# Patient Record
Sex: MALE | Race: ASIAN | NOT HISPANIC OR LATINO | URBAN - METROPOLITAN AREA
[De-identification: names, ages, dates, MRNs, and addresses within clinical notes are randomized per-mention and may not be internally consistent; named-entity substitution may affect disease eponyms.]

---

## 2019-12-28 ENCOUNTER — INPATIENT (INPATIENT)
Facility: HOSPITAL | Age: 64
LOS: 1 days | Discharge: ANOTHER HOSPITAL | End: 2019-12-30
Attending: INTERNAL MEDICINE | Admitting: INTERNAL MEDICINE
Payer: MEDICAID

## 2019-12-28 VITALS
DIASTOLIC BLOOD PRESSURE: 81 MMHG | TEMPERATURE: 98 F | HEART RATE: 71 BPM | SYSTOLIC BLOOD PRESSURE: 149 MMHG | RESPIRATION RATE: 18 BRPM

## 2019-12-28 DIAGNOSIS — F10.10 ALCOHOL ABUSE, UNCOMPLICATED: ICD-10-CM

## 2019-12-28 DIAGNOSIS — I10 ESSENTIAL (PRIMARY) HYPERTENSION: ICD-10-CM

## 2019-12-28 DIAGNOSIS — E78.5 HYPERLIPIDEMIA, UNSPECIFIED: ICD-10-CM

## 2019-12-28 DIAGNOSIS — Z29.9 ENCOUNTER FOR PROPHYLACTIC MEASURES, UNSPECIFIED: ICD-10-CM

## 2019-12-28 DIAGNOSIS — I20.0 UNSTABLE ANGINA: ICD-10-CM

## 2019-12-28 DIAGNOSIS — R07.9 CHEST PAIN, UNSPECIFIED: ICD-10-CM

## 2019-12-28 LAB
ALBUMIN SERPL ELPH-MCNC: 4.6 G/DL — SIGNIFICANT CHANGE UP (ref 3.3–5)
ALP SERPL-CCNC: 71 U/L — SIGNIFICANT CHANGE UP (ref 40–120)
ALT FLD-CCNC: 30 U/L — SIGNIFICANT CHANGE UP (ref 4–41)
ANION GAP SERPL CALC-SCNC: 15 MMO/L — HIGH (ref 7–14)
APTT BLD: 104.5 SEC — HIGH (ref 27.5–36.3)
APTT BLD: 39.1 SEC — HIGH (ref 27.5–36.3)
AST SERPL-CCNC: 33 U/L — SIGNIFICANT CHANGE UP (ref 4–40)
BASOPHILS # BLD AUTO: 0.08 K/UL — SIGNIFICANT CHANGE UP (ref 0–0.2)
BASOPHILS NFR BLD AUTO: 1 % — SIGNIFICANT CHANGE UP (ref 0–2)
BILIRUB SERPL-MCNC: < 0.2 MG/DL — LOW (ref 0.2–1.2)
BUN SERPL-MCNC: 23 MG/DL — SIGNIFICANT CHANGE UP (ref 7–23)
CALCIUM SERPL-MCNC: 9.9 MG/DL — SIGNIFICANT CHANGE UP (ref 8.4–10.5)
CHLORIDE SERPL-SCNC: 100 MMOL/L — SIGNIFICANT CHANGE UP (ref 98–107)
CK MB BLD-MCNC: 2.16 NG/ML — SIGNIFICANT CHANGE UP (ref 1–6.6)
CK SERPL-CCNC: 186 U/L — SIGNIFICANT CHANGE UP (ref 30–200)
CO2 SERPL-SCNC: 21 MMOL/L — LOW (ref 22–31)
CREAT SERPL-MCNC: 0.9 MG/DL — SIGNIFICANT CHANGE UP (ref 0.5–1.3)
EOSINOPHIL # BLD AUTO: 0.16 K/UL — SIGNIFICANT CHANGE UP (ref 0–0.5)
EOSINOPHIL NFR BLD AUTO: 1.9 % — SIGNIFICANT CHANGE UP (ref 0–6)
ETHANOL BLD-MCNC: < 10 MG/DL — SIGNIFICANT CHANGE UP
GLUCOSE SERPL-MCNC: 89 MG/DL — SIGNIFICANT CHANGE UP (ref 70–99)
HCT VFR BLD CALC: 39.1 % — SIGNIFICANT CHANGE UP (ref 39–50)
HCT VFR BLD CALC: 41.2 % — SIGNIFICANT CHANGE UP (ref 39–50)
HGB BLD-MCNC: 12.8 G/DL — LOW (ref 13–17)
HGB BLD-MCNC: 13.8 G/DL — SIGNIFICANT CHANGE UP (ref 13–17)
IMM GRANULOCYTES NFR BLD AUTO: 1.9 % — HIGH (ref 0–1.5)
INR BLD: 0.98 — SIGNIFICANT CHANGE UP (ref 0.88–1.17)
LYMPHOCYTES # BLD AUTO: 2.22 K/UL — SIGNIFICANT CHANGE UP (ref 1–3.3)
LYMPHOCYTES # BLD AUTO: 26.8 % — SIGNIFICANT CHANGE UP (ref 13–44)
MCHC RBC-ENTMCNC: 32.7 % — SIGNIFICANT CHANGE UP (ref 32–36)
MCHC RBC-ENTMCNC: 33.2 PG — SIGNIFICANT CHANGE UP (ref 27–34)
MCHC RBC-ENTMCNC: 33.5 % — SIGNIFICANT CHANGE UP (ref 32–36)
MCHC RBC-ENTMCNC: 33.8 PG — SIGNIFICANT CHANGE UP (ref 27–34)
MCV RBC AUTO: 101 FL — HIGH (ref 80–100)
MCV RBC AUTO: 101.6 FL — HIGH (ref 80–100)
MONOCYTES # BLD AUTO: 0.78 K/UL — SIGNIFICANT CHANGE UP (ref 0–0.9)
MONOCYTES NFR BLD AUTO: 9.4 % — SIGNIFICANT CHANGE UP (ref 2–14)
NEUTROPHILS # BLD AUTO: 4.88 K/UL — SIGNIFICANT CHANGE UP (ref 1.8–7.4)
NEUTROPHILS NFR BLD AUTO: 59 % — SIGNIFICANT CHANGE UP (ref 43–77)
NRBC # FLD: 0 K/UL — SIGNIFICANT CHANGE UP (ref 0–0)
NRBC # FLD: 0 K/UL — SIGNIFICANT CHANGE UP (ref 0–0)
NT-PROBNP SERPL-SCNC: 23.09 PG/ML — SIGNIFICANT CHANGE UP
PLATELET # BLD AUTO: 304 K/UL — SIGNIFICANT CHANGE UP (ref 150–400)
PLATELET # BLD AUTO: 313 K/UL — SIGNIFICANT CHANGE UP (ref 150–400)
PMV BLD: 9.4 FL — SIGNIFICANT CHANGE UP (ref 7–13)
PMV BLD: 9.4 FL — SIGNIFICANT CHANGE UP (ref 7–13)
POTASSIUM SERPL-MCNC: 4.1 MMOL/L — SIGNIFICANT CHANGE UP (ref 3.5–5.3)
POTASSIUM SERPL-SCNC: 4.1 MMOL/L — SIGNIFICANT CHANGE UP (ref 3.5–5.3)
PROT SERPL-MCNC: 7.6 G/DL — SIGNIFICANT CHANGE UP (ref 6–8.3)
PROTHROM AB SERPL-ACNC: 11.2 SEC — SIGNIFICANT CHANGE UP (ref 9.8–13.1)
RBC # BLD: 3.85 M/UL — LOW (ref 4.2–5.8)
RBC # BLD: 4.08 M/UL — LOW (ref 4.2–5.8)
RBC # FLD: 12.7 % — SIGNIFICANT CHANGE UP (ref 10.3–14.5)
RBC # FLD: 12.9 % — SIGNIFICANT CHANGE UP (ref 10.3–14.5)
SODIUM SERPL-SCNC: 136 MMOL/L — SIGNIFICANT CHANGE UP (ref 135–145)
TROPONIN T, HIGH SENSITIVITY: 11 NG/L — SIGNIFICANT CHANGE UP (ref ?–14)
TROPONIN T, HIGH SENSITIVITY: 12 NG/L — SIGNIFICANT CHANGE UP (ref ?–14)
WBC # BLD: 8.26 K/UL — SIGNIFICANT CHANGE UP (ref 3.8–10.5)
WBC # BLD: 8.28 K/UL — SIGNIFICANT CHANGE UP (ref 3.8–10.5)
WBC # FLD AUTO: 8.26 K/UL — SIGNIFICANT CHANGE UP (ref 3.8–10.5)
WBC # FLD AUTO: 8.28 K/UL — SIGNIFICANT CHANGE UP (ref 3.8–10.5)

## 2019-12-28 PROCEDURE — 71046 X-RAY EXAM CHEST 2 VIEWS: CPT | Mod: 26

## 2019-12-28 RX ORDER — CLOPIDOGREL BISULFATE 75 MG/1
300 TABLET, FILM COATED ORAL ONCE
Refills: 0 | Status: COMPLETED | OUTPATIENT
Start: 2019-12-28 | End: 2019-12-28

## 2019-12-28 RX ORDER — HEPARIN SODIUM 5000 [USP'U]/ML
500 INJECTION INTRAVENOUS; SUBCUTANEOUS
Qty: 25000 | Refills: 0 | Status: DISCONTINUED | OUTPATIENT
Start: 2019-12-28 | End: 2019-12-29

## 2019-12-28 RX ORDER — CLOPIDOGREL BISULFATE 75 MG/1
75 TABLET, FILM COATED ORAL DAILY
Refills: 0 | Status: DISCONTINUED | OUTPATIENT
Start: 2019-12-29 | End: 2019-12-30

## 2019-12-28 RX ORDER — HEPARIN SODIUM 5000 [USP'U]/ML
3500 INJECTION INTRAVENOUS; SUBCUTANEOUS ONCE
Refills: 0 | Status: COMPLETED | OUTPATIENT
Start: 2019-12-28 | End: 2019-12-28

## 2019-12-28 RX ORDER — LABETALOL HCL 100 MG
100 TABLET ORAL
Refills: 0 | Status: DISCONTINUED | OUTPATIENT
Start: 2019-12-28 | End: 2019-12-29

## 2019-12-28 RX ORDER — HEPARIN SODIUM 5000 [USP'U]/ML
3500 INJECTION INTRAVENOUS; SUBCUTANEOUS EVERY 6 HOURS
Refills: 0 | Status: DISCONTINUED | OUTPATIENT
Start: 2019-12-28 | End: 2019-12-28

## 2019-12-28 RX ORDER — ATORVASTATIN CALCIUM 80 MG/1
80 TABLET, FILM COATED ORAL AT BEDTIME
Refills: 0 | Status: DISCONTINUED | OUTPATIENT
Start: 2019-12-28 | End: 2019-12-30

## 2019-12-28 RX ORDER — ATORVASTATIN CALCIUM 80 MG/1
1 TABLET, FILM COATED ORAL
Qty: 0 | Refills: 0 | DISCHARGE

## 2019-12-28 RX ORDER — NICOTINE POLACRILEX 2 MG
1 GUM BUCCAL DAILY
Refills: 0 | Status: DISCONTINUED | OUTPATIENT
Start: 2019-12-28 | End: 2019-12-30

## 2019-12-28 RX ORDER — HEPARIN SODIUM 5000 [USP'U]/ML
INJECTION INTRAVENOUS; SUBCUTANEOUS
Qty: 25000 | Refills: 0 | Status: DISCONTINUED | OUTPATIENT
Start: 2019-12-28 | End: 2019-12-28

## 2019-12-28 RX ORDER — ISOSORBIDE DINITRATE 5 MG/1
5 TABLET ORAL THREE TIMES A DAY
Refills: 0 | Status: DISCONTINUED | OUTPATIENT
Start: 2019-12-28 | End: 2019-12-29

## 2019-12-28 RX ORDER — HEPARIN SODIUM 5000 [USP'U]/ML
3500 INJECTION INTRAVENOUS; SUBCUTANEOUS EVERY 6 HOURS
Refills: 0 | Status: DISCONTINUED | OUTPATIENT
Start: 2019-12-28 | End: 2019-12-29

## 2019-12-28 RX ORDER — INFLUENZA VIRUS VACCINE 15; 15; 15; 15 UG/.5ML; UG/.5ML; UG/.5ML; UG/.5ML
0.5 SUSPENSION INTRAMUSCULAR ONCE
Refills: 0 | Status: DISCONTINUED | OUTPATIENT
Start: 2019-12-28 | End: 2019-12-30

## 2019-12-28 RX ORDER — ASPIRIN/CALCIUM CARB/MAGNESIUM 324 MG
81 TABLET ORAL DAILY
Refills: 0 | Status: DISCONTINUED | OUTPATIENT
Start: 2019-12-28 | End: 2019-12-30

## 2019-12-28 RX ADMIN — ISOSORBIDE DINITRATE 5 MILLIGRAM(S): 5 TABLET ORAL at 22:07

## 2019-12-28 RX ADMIN — CLOPIDOGREL BISULFATE 300 MILLIGRAM(S): 75 TABLET, FILM COATED ORAL at 15:30

## 2019-12-28 RX ADMIN — HEPARIN SODIUM 0 UNIT(S)/HR: 5000 INJECTION INTRAVENOUS; SUBCUTANEOUS at 22:21

## 2019-12-28 RX ADMIN — ATORVASTATIN CALCIUM 80 MILLIGRAM(S): 80 TABLET, FILM COATED ORAL at 21:43

## 2019-12-28 RX ADMIN — Medication 100 MILLIGRAM(S): at 17:43

## 2019-12-28 RX ADMIN — HEPARIN SODIUM 3500 UNIT(S): 5000 INJECTION INTRAVENOUS; SUBCUTANEOUS at 15:31

## 2019-12-28 RX ADMIN — HEPARIN SODIUM 700 UNIT(S)/HR: 5000 INJECTION INTRAVENOUS; SUBCUTANEOUS at 15:31

## 2019-12-28 NOTE — ED PROVIDER NOTE - OBJECTIVE STATEMENT
64M, [place], hx HTN, HLD presents via EMS with chief complaint of 15 minutes mid sternal chest pressure/tightness which resolved prior to EMS arrival. Chest pain associated w shortness of breath, headache, and tingling to LUE. No neck/back pain, diaphoresis, nausea, vomiting, lightheadedness, dizziness, blurry vision associated. EMS administered 162 aspirin prior to ED arrival. On arrival, no chest pain, LUE tingling, or other active symptoms at present time.   No fevers or chills, nausea or vomiting, leg swelling, calf pain, urinary sx, cough, congestion.     Patient reports prior episode of chest discomfort ~1 month ago in [facility] which resolved and had not sought medical care at that time (took advil at that time).    Patient had a syncopal episode 12/15 in [place], associated with seizure-like episode. No reported preceding sx (no chest pain, shortness of breath, nausea). At that time, went to hospital, and patient reportedly had elevated cardiac enzymes at that time of 0.13, 0.24, 0.24 {troponin I}. Also noted to have elevated d-dimer, CTA performed, no PE. CT head w/o acute pathology. Patient left hospital due to insurance coverage issues /  requirement to pay cash as per daughter.    Current smoker. Lives in [facility]. Denies prior hx MI, stents. No recent illness, sick contacts.   No allergies. Not currently taking any medication (except [id number] and statin for the last 1 week which patient has been taking from his daughter supply - not prescribed by physician).

## 2019-12-28 NOTE — ED PROVIDER NOTE - PROGRESS NOTE DETAILS
VM left for tele doc, awaiting callback for admission  Rajinder Sherwood MD, PGY3 Emergency Medicine

## 2019-12-28 NOTE — H&P ADULT - PROBLEM SELECTOR PLAN 4
has withdrawl seizure 12/14, last drink 12/15. check blood etoh level. unlikely to withdrawal if last drink is over 2 weeks ago

## 2019-12-28 NOTE — H&P ADULT - HISTORY OF PRESENT ILLNESS
63 yo Kenyan Male, HTN, HLD presented with CP X15 mins ~9AM at date of admission. Pt reported substernal chest pain with radiation to left arm while sitting, + SOB, dizziness. Denied any diaphoresis, palpitation, syncope, fever, chills, N/V, blurry vision. Pt reported taking baby asa and EMS gave him additional 162mg during transport. Currently CP free. Pt and family reported pt with syncopal episode 12/15 in [place] that provider think is associated with withdrawal seizure. + foaming at the mouth, tongue laceration. CT head and EEG done at the time unremarkable. CTA chest negative for PE. Pt also with + CE at the time recommended for cath and refused further workup, signed out against medical advise. Of not pt drinks quart of hard liquor daily before the syncope 12/14, last drink 12/15.

## 2019-12-28 NOTE — ED PROVIDER NOTE - ATTENDING CONTRIBUTION TO CARE
Kay Schuler M.D: 64M, Kyrgyz, hx htn hld, recent admission in [place] for syncope/seziure at which point he had elevated troponins but no further workup done at that time, presenting with 15 minute episode of chest tightness, described as pressure/someone punching his chest, assoc with sob no diaphoresis no n/v no f/c no cough. given 2 asa by ems. ekg in triage with concern for hyperacute t waves so brought to trauma a. no symptoms at pressent. former smoker. exam unremakrable, agree with as documented by resident.  jongn for unstable angina given pain at rest that is concerning for cardiac ischemia. ekg with concern for hyperacute t waves. plan for labs, ekg, cxr, tele monitoring, serial ekgs. unlikely dissection or pe given no rf for pe and no signs or symptoms consistent with either. no infectious symptoms to suggest pna.

## 2019-12-28 NOTE — ED ADULT NURSE NOTE - CHIEF COMPLAINT QUOTE
Pt arrives via EMS--pt was admitted to a hospital in [place], last week after syncope--troponin was high and pt has no insurance (visiting from [facility])  Pt left and this AM pt c/o chest pressure

## 2019-12-28 NOTE — H&P ADULT - NSHPSOCIALHISTORY_GEN_ALL_CORE
1ppd for 44 yrs  hard liquor quater daily, last drink 1/2/15  denied any ilicit drug use    visiting from [facility], no insurance

## 2019-12-28 NOTE — ED PROVIDER NOTE - PHYSICAL EXAMINATION
General: Well appearing, awake, alert, oriented, no acute distress. Resting in bed.  HEENT: PERRLA EOMI. No trauma/bruising noted to head or face.    CV: Regular rate and rhythm, S1/S2, no murmurs/rubs/gallops noted on exam. No tenderness to palpation to chest wall.   Lungs: Clear to ascultation bilaterally, no wheezes/crackles/rales noted on exam. Equal chest wall excursion noted.   Abdomen: Soft, non tender, non distended, no guarding or rebound.   MSK: Intact ROM of upper and lower extremities bilaterally. No tenderness to palpation to extremities. Intact ROM of neck. No gross deformities noted to extremities.   Neuro: Awake, A+O x4, moving all extremities spontaneously. CN 2-12 grossly intact. No nystagmus noted. Strength and sensation grossly intact to all extremities. Speech fluent, no slurred speech.   Extremities: No swelling or edema noted to extremities. No calf tenderness to palpation.   Skin: No rash or bruising noted on exam.

## 2019-12-28 NOTE — CHART NOTE - NSCHARTNOTEFT_GEN_A_CORE
Pt seen and examined   full H/P to follow   unstable angina   recent admission with elevated trop   start heparin infusion   trend trop  DAPT  statin   cath on monday

## 2019-12-28 NOTE — H&P ADULT - ASSESSMENT
63 yo Lao Male, HTN, HLD, ETOH use last drink 12/15 (reported WD sizure 12/14) presented with CP concern for unstable angina.

## 2019-12-28 NOTE — ED ADULT TRIAGE NOTE - CHIEF COMPLAINT QUOTE
Pt arrives via EMS--pt was admitted to a hospital in [place], last week after syncope--troponin was high and pt has no insurance (visiting from [facility])  Pt left and this AM pt c/o chest pressure

## 2019-12-28 NOTE — H&P ADULT - NSHPLABSRESULTS_GEN_ALL_CORE
13.8   8.28  )-----------( 313      ( 28 Dec 2019 12:23 )             41.2   12-28    136  |  100  |  23  ----------------------------<  89  4.1   |  21<L>  |  0.90    Ca    9.9      28 Dec 2019 12:23    TPro  7.6  /  Alb  4.6  /  TBili  < 0.2<L>  /  DBili  x   /  AST  33  /  ALT  30  /  AlkPhos  71  12-28    trop: 11-->12  EKG: SR 66 TWI AVR, AVL, no ST changes   CXR: clear lung

## 2019-12-28 NOTE — ED PROVIDER NOTE - CLINICAL SUMMARY MEDICAL DECISION MAKING FREE TEXT BOX
64M, [place] African, hx HTN HLD presenting s/p episode chest pain, shortness of breath. Recent syncopal event w elevated troponin at OSH. Exam as above. EKG w q waves precordial. Concern for cardiac etiology. Labs, CXR, will require admit.   Rajinder Sherwood MD, PGY3 Emergency Medicine

## 2019-12-29 LAB
ANION GAP SERPL CALC-SCNC: 14 MMO/L — SIGNIFICANT CHANGE UP (ref 7–14)
APTT BLD: 49.8 SEC — HIGH (ref 27.5–36.3)
BASOPHILS # BLD AUTO: 0.06 K/UL — SIGNIFICANT CHANGE UP (ref 0–0.2)
BASOPHILS NFR BLD AUTO: 0.9 % — SIGNIFICANT CHANGE UP (ref 0–2)
BUN SERPL-MCNC: 22 MG/DL — SIGNIFICANT CHANGE UP (ref 7–23)
CALCIUM SERPL-MCNC: 9.9 MG/DL — SIGNIFICANT CHANGE UP (ref 8.4–10.5)
CHLORIDE SERPL-SCNC: 99 MMOL/L — SIGNIFICANT CHANGE UP (ref 98–107)
CHOLEST SERPL-MCNC: 162 MG/DL — SIGNIFICANT CHANGE UP (ref 120–199)
CO2 SERPL-SCNC: 23 MMOL/L — SIGNIFICANT CHANGE UP (ref 22–31)
CREAT SERPL-MCNC: 0.99 MG/DL — SIGNIFICANT CHANGE UP (ref 0.5–1.3)
EOSINOPHIL # BLD AUTO: 0.22 K/UL — SIGNIFICANT CHANGE UP (ref 0–0.5)
EOSINOPHIL NFR BLD AUTO: 3.4 % — SIGNIFICANT CHANGE UP (ref 0–6)
GLUCOSE SERPL-MCNC: 83 MG/DL — SIGNIFICANT CHANGE UP (ref 70–99)
HCT VFR BLD CALC: 37 % — LOW (ref 39–50)
HCT VFR BLD CALC: 39 % — SIGNIFICANT CHANGE UP (ref 39–50)
HCV AB S/CO SERPL IA: 0.06 S/CO — SIGNIFICANT CHANGE UP (ref 0–0.99)
HCV AB SERPL-IMP: SIGNIFICANT CHANGE UP
HDLC SERPL-MCNC: 88 MG/DL — HIGH (ref 35–55)
HGB BLD-MCNC: 12.2 G/DL — LOW (ref 13–17)
HGB BLD-MCNC: 12.9 G/DL — LOW (ref 13–17)
IMM GRANULOCYTES NFR BLD AUTO: 2 % — HIGH (ref 0–1.5)
LIPID PNL WITH DIRECT LDL SERPL: 64 MG/DL — SIGNIFICANT CHANGE UP
LYMPHOCYTES # BLD AUTO: 1.07 K/UL — SIGNIFICANT CHANGE UP (ref 1–3.3)
LYMPHOCYTES # BLD AUTO: 16.3 % — SIGNIFICANT CHANGE UP (ref 13–44)
MAGNESIUM SERPL-MCNC: 2 MG/DL — SIGNIFICANT CHANGE UP (ref 1.6–2.6)
MCHC RBC-ENTMCNC: 33 % — SIGNIFICANT CHANGE UP (ref 32–36)
MCHC RBC-ENTMCNC: 33.1 % — SIGNIFICANT CHANGE UP (ref 32–36)
MCHC RBC-ENTMCNC: 33.4 PG — SIGNIFICANT CHANGE UP (ref 27–34)
MCHC RBC-ENTMCNC: 33.9 PG — SIGNIFICANT CHANGE UP (ref 27–34)
MCV RBC AUTO: 101.4 FL — HIGH (ref 80–100)
MCV RBC AUTO: 102.6 FL — HIGH (ref 80–100)
MONOCYTES # BLD AUTO: 0.59 K/UL — SIGNIFICANT CHANGE UP (ref 0–0.9)
MONOCYTES NFR BLD AUTO: 9 % — SIGNIFICANT CHANGE UP (ref 2–14)
NEUTROPHILS # BLD AUTO: 4.49 K/UL — SIGNIFICANT CHANGE UP (ref 1.8–7.4)
NEUTROPHILS NFR BLD AUTO: 68.4 % — SIGNIFICANT CHANGE UP (ref 43–77)
NRBC # FLD: 0 K/UL — SIGNIFICANT CHANGE UP (ref 0–0)
NRBC # FLD: 0 K/UL — SIGNIFICANT CHANGE UP (ref 0–0)
PLATELET # BLD AUTO: 289 K/UL — SIGNIFICANT CHANGE UP (ref 150–400)
PLATELET # BLD AUTO: 325 K/UL — SIGNIFICANT CHANGE UP (ref 150–400)
PMV BLD: 9.5 FL — SIGNIFICANT CHANGE UP (ref 7–13)
PMV BLD: 9.9 FL — SIGNIFICANT CHANGE UP (ref 7–13)
POTASSIUM SERPL-MCNC: 4.2 MMOL/L — SIGNIFICANT CHANGE UP (ref 3.5–5.3)
POTASSIUM SERPL-SCNC: 4.2 MMOL/L — SIGNIFICANT CHANGE UP (ref 3.5–5.3)
RBC # BLD: 3.65 M/UL — LOW (ref 4.2–5.8)
RBC # BLD: 3.8 M/UL — LOW (ref 4.2–5.8)
RBC # FLD: 12.8 % — SIGNIFICANT CHANGE UP (ref 10.3–14.5)
RBC # FLD: 13.1 % — SIGNIFICANT CHANGE UP (ref 10.3–14.5)
SODIUM SERPL-SCNC: 136 MMOL/L — SIGNIFICANT CHANGE UP (ref 135–145)
TRIGL SERPL-MCNC: 59 MG/DL — SIGNIFICANT CHANGE UP (ref 10–149)
TSH SERPL-MCNC: 2.39 UIU/ML — SIGNIFICANT CHANGE UP (ref 0.27–4.2)
WBC # BLD: 6.24 K/UL — SIGNIFICANT CHANGE UP (ref 3.8–10.5)
WBC # BLD: 6.56 K/UL — SIGNIFICANT CHANGE UP (ref 3.8–10.5)
WBC # FLD AUTO: 6.24 K/UL — SIGNIFICANT CHANGE UP (ref 3.8–10.5)
WBC # FLD AUTO: 6.56 K/UL — SIGNIFICANT CHANGE UP (ref 3.8–10.5)

## 2019-12-29 RX ORDER — HEPARIN SODIUM 5000 [USP'U]/ML
5000 INJECTION INTRAVENOUS; SUBCUTANEOUS EVERY 8 HOURS
Refills: 0 | Status: DISCONTINUED | OUTPATIENT
Start: 2019-12-29 | End: 2019-12-30

## 2019-12-29 RX ORDER — AMLODIPINE BESYLATE 2.5 MG/1
5 TABLET ORAL DAILY
Refills: 0 | Status: DISCONTINUED | OUTPATIENT
Start: 2019-12-29 | End: 2019-12-30

## 2019-12-29 RX ADMIN — Medication 81 MILLIGRAM(S): at 11:26

## 2019-12-29 RX ADMIN — Medication 1 PATCH: at 19:00

## 2019-12-29 RX ADMIN — ISOSORBIDE DINITRATE 5 MILLIGRAM(S): 5 TABLET ORAL at 05:05

## 2019-12-29 RX ADMIN — ATORVASTATIN CALCIUM 80 MILLIGRAM(S): 80 TABLET, FILM COATED ORAL at 22:36

## 2019-12-29 RX ADMIN — HEPARIN SODIUM 500 UNIT(S)/HR: 5000 INJECTION INTRAVENOUS; SUBCUTANEOUS at 00:15

## 2019-12-29 RX ADMIN — HEPARIN SODIUM 600 UNIT(S)/HR: 5000 INJECTION INTRAVENOUS; SUBCUTANEOUS at 08:46

## 2019-12-29 RX ADMIN — Medication 1 PATCH: at 11:26

## 2019-12-29 RX ADMIN — CLOPIDOGREL BISULFATE 75 MILLIGRAM(S): 75 TABLET, FILM COATED ORAL at 11:26

## 2019-12-29 NOTE — PROGRESS NOTE ADULT - ASSESSMENT
Unstable angina  cont dapt  no evidence of acute mI  will dc heparin   cath tomorrow     HTN  amlodipine  will dc bb due to one episode of missed beat avb     HA  due to nitrates  will dc it

## 2019-12-29 NOTE — PROGRESS NOTE ADULT - SUBJECTIVE AND OBJECTIVE BOX
Subjective: Patient seen and examined. No new events except as noted.     SUBJECTIVE/ROS:  No chest pain, dyspnea, palpitation, or dizziness.   has mild HA       MEDICATIONS:  MEDICATIONS  (STANDING):  amLODIPine   Tablet 5 milliGRAM(s) Oral daily  aspirin enteric coated 81 milliGRAM(s) Oral daily  atorvastatin 80 milliGRAM(s) Oral at bedtime  clopidogrel Tablet 75 milliGRAM(s) Oral daily  heparin  Infusion. 500 Unit(s)/Hr (5 mL/Hr) IV Continuous <Continuous>  influenza   Vaccine 0.5 milliLiter(s) IntraMuscular once  nicotine - 21 mG/24Hr(s) Patch 1 patch Transdermal daily      PHYSICAL EXAM:  T(C): 36.3 (12-29-19 @ 09:00), Max: 36.9 (12-28-19 @ 16:38)  HR: 76 (12-29-19 @ 09:00) (54 - 76)  BP: 113/72 (12-29-19 @ 09:00) (107/74 - 181/85)  RR: 16 (12-29-19 @ 09:00) (16 - 18)  SpO2: 100% (12-29-19 @ 09:00) (99% - 100%)  Wt(kg): --  I&O's Summary    Height (cm): 180.34 (12-28 @ 15:42)  Weight (kg): 57.2 (12-28 @ 15:42)  BMI (kg/m2): 17.6 (12-28 @ 15:42)  BSA (m2): 1.73 (12-28 @ 15:42)      JVP: Normal  Neck: supple  Lung: clear   CV: S1 S2 , Murmur:  Abd: soft  Ext: No edema  neuro: Awake / alert  Psych: flat affect  Skin: normal``    LABS/DATA:    CARDIAC MARKERS:  CARDIAC MARKERS ( 28 Dec 2019 13:59 )  x     / x     / 186 u/L / 2.16 ng/mL / x                                    12.9   6.56  )-----------( 325      ( 29 Dec 2019 06:35 )             39.0     12-29    136  |  99  |  22  ----------------------------<  83  4.2   |  23  |  0.99    Ca    9.9      29 Dec 2019 06:35  Mg     2.0     12-29    TPro  7.6  /  Alb  4.6  /  TBili  < 0.2<L>  /  DBili  x   /  AST  33  /  ALT  30  /  AlkPhos  71  12-28    proBNP: Serum Pro-Brain Natriuretic Peptide: 23.09 pg/mL (12-28 @ 12:23)    Lipid Profile:   HgA1c:   TSH: Thyroid Stimulating Hormone, Serum: 2.39 uIU/mL (12-29 @ 06:35)      TELE:  EKG:

## 2019-12-30 ENCOUNTER — TRANSCRIPTION ENCOUNTER (OUTPATIENT)
Age: 64
End: 2019-12-30

## 2019-12-30 VITALS
SYSTOLIC BLOOD PRESSURE: 140 MMHG | DIASTOLIC BLOOD PRESSURE: 88 MMHG | RESPIRATION RATE: 17 BRPM | TEMPERATURE: 98 F | HEART RATE: 82 BPM | OXYGEN SATURATION: 99 %

## 2019-12-30 VITALS
HEART RATE: 98 BPM | OXYGEN SATURATION: 99 % | WEIGHT: 125.66 LBS | SYSTOLIC BLOOD PRESSURE: 137 MMHG | TEMPERATURE: 98 F | HEIGHT: 71 IN | RESPIRATION RATE: 18 BRPM | DIASTOLIC BLOOD PRESSURE: 88 MMHG

## 2019-12-30 LAB
ANION GAP SERPL CALC-SCNC: 12 MMO/L — SIGNIFICANT CHANGE UP (ref 7–14)
BASOPHILS # BLD AUTO: 0.07 K/UL — SIGNIFICANT CHANGE UP (ref 0–0.2)
BASOPHILS NFR BLD AUTO: 1 % — SIGNIFICANT CHANGE UP (ref 0–2)
BUN SERPL-MCNC: 19 MG/DL — SIGNIFICANT CHANGE UP (ref 7–23)
CALCIUM SERPL-MCNC: 9.6 MG/DL — SIGNIFICANT CHANGE UP (ref 8.4–10.5)
CHLORIDE SERPL-SCNC: 101 MMOL/L — SIGNIFICANT CHANGE UP (ref 98–107)
CO2 SERPL-SCNC: 25 MMOL/L — SIGNIFICANT CHANGE UP (ref 22–31)
CREAT SERPL-MCNC: 0.89 MG/DL — SIGNIFICANT CHANGE UP (ref 0.5–1.3)
EOSINOPHIL # BLD AUTO: 0.23 K/UL — SIGNIFICANT CHANGE UP (ref 0–0.5)
EOSINOPHIL NFR BLD AUTO: 3.1 % — SIGNIFICANT CHANGE UP (ref 0–6)
GLUCOSE SERPL-MCNC: 90 MG/DL — SIGNIFICANT CHANGE UP (ref 70–99)
HCT VFR BLD CALC: 37.1 % — LOW (ref 39–50)
HCT VFR BLD CALC: 37.1 % — LOW (ref 39–50)
HGB BLD-MCNC: 12.1 G/DL — LOW (ref 13–17)
HGB BLD-MCNC: 12.1 G/DL — LOW (ref 13–17)
IMM GRANULOCYTES NFR BLD AUTO: 1.4 % — SIGNIFICANT CHANGE UP (ref 0–1.5)
LYMPHOCYTES # BLD AUTO: 1.88 K/UL — SIGNIFICANT CHANGE UP (ref 1–3.3)
LYMPHOCYTES # BLD AUTO: 25.7 % — SIGNIFICANT CHANGE UP (ref 13–44)
MAGNESIUM SERPL-MCNC: 2 MG/DL — SIGNIFICANT CHANGE UP (ref 1.6–2.6)
MCHC RBC-ENTMCNC: 32.6 % — SIGNIFICANT CHANGE UP (ref 32–36)
MCHC RBC-ENTMCNC: 32.6 % — SIGNIFICANT CHANGE UP (ref 32–36)
MCHC RBC-ENTMCNC: 32.8 PG — SIGNIFICANT CHANGE UP (ref 27–34)
MCHC RBC-ENTMCNC: 32.8 PG — SIGNIFICANT CHANGE UP (ref 27–34)
MCV RBC AUTO: 100.5 FL — HIGH (ref 80–100)
MCV RBC AUTO: 100.5 FL — HIGH (ref 80–100)
MONOCYTES # BLD AUTO: 0.85 K/UL — SIGNIFICANT CHANGE UP (ref 0–0.9)
MONOCYTES NFR BLD AUTO: 11.6 % — SIGNIFICANT CHANGE UP (ref 2–14)
NEUTROPHILS # BLD AUTO: 4.19 K/UL — SIGNIFICANT CHANGE UP (ref 1.8–7.4)
NEUTROPHILS NFR BLD AUTO: 57.2 % — SIGNIFICANT CHANGE UP (ref 43–77)
NRBC # FLD: 0 K/UL — SIGNIFICANT CHANGE UP (ref 0–0)
NRBC # FLD: 0 K/UL — SIGNIFICANT CHANGE UP (ref 0–0)
PLATELET # BLD AUTO: 307 K/UL — SIGNIFICANT CHANGE UP (ref 150–400)
PLATELET # BLD AUTO: 307 K/UL — SIGNIFICANT CHANGE UP (ref 150–400)
PMV BLD: 9.4 FL — SIGNIFICANT CHANGE UP (ref 7–13)
PMV BLD: 9.4 FL — SIGNIFICANT CHANGE UP (ref 7–13)
POTASSIUM SERPL-MCNC: 4.1 MMOL/L — SIGNIFICANT CHANGE UP (ref 3.5–5.3)
POTASSIUM SERPL-SCNC: 4.1 MMOL/L — SIGNIFICANT CHANGE UP (ref 3.5–5.3)
RBC # BLD: 3.69 M/UL — LOW (ref 4.2–5.8)
RBC # BLD: 3.69 M/UL — LOW (ref 4.2–5.8)
RBC # FLD: 12.8 % — SIGNIFICANT CHANGE UP (ref 10.3–14.5)
RBC # FLD: 12.8 % — SIGNIFICANT CHANGE UP (ref 10.3–14.5)
SODIUM SERPL-SCNC: 138 MMOL/L — SIGNIFICANT CHANGE UP (ref 135–145)
WBC # BLD: 7.32 K/UL — SIGNIFICANT CHANGE UP (ref 3.8–10.5)
WBC # BLD: 7.32 K/UL — SIGNIFICANT CHANGE UP (ref 3.8–10.5)
WBC # FLD AUTO: 7.32 K/UL — SIGNIFICANT CHANGE UP (ref 3.8–10.5)
WBC # FLD AUTO: 7.32 K/UL — SIGNIFICANT CHANGE UP (ref 3.8–10.5)

## 2019-12-30 PROCEDURE — 93458 L HRT ARTERY/VENTRICLE ANGIO: CPT | Mod: 26

## 2019-12-30 RX ORDER — NICOTINE POLACRILEX 2 MG
21 GUM BUCCAL
Qty: 0 | Refills: 0 | DISCHARGE
Start: 2019-12-30

## 2019-12-30 RX ORDER — AMLODIPINE BESYLATE 2.5 MG/1
1 TABLET ORAL
Qty: 0 | Refills: 0 | DISCHARGE

## 2019-12-30 RX ORDER — ASPIRIN/CALCIUM CARB/MAGNESIUM 324 MG
1 TABLET ORAL
Qty: 0 | Refills: 0 | DISCHARGE
Start: 2019-12-30

## 2019-12-30 RX ORDER — HEPARIN SODIUM 5000 [USP'U]/ML
5000 INJECTION INTRAVENOUS; SUBCUTANEOUS
Qty: 0 | Refills: 0 | DISCHARGE
Start: 2019-12-30

## 2019-12-30 RX ORDER — ATORVASTATIN CALCIUM 80 MG/1
1 TABLET, FILM COATED ORAL
Qty: 0 | Refills: 0 | DISCHARGE

## 2019-12-30 RX ORDER — ASPIRIN/CALCIUM CARB/MAGNESIUM 324 MG
1 TABLET ORAL
Qty: 0 | Refills: 0 | DISCHARGE

## 2019-12-30 RX ORDER — AMLODIPINE BESYLATE 2.5 MG/1
1 TABLET ORAL
Qty: 0 | Refills: 0 | DISCHARGE
Start: 2019-12-30

## 2019-12-30 RX ORDER — ATORVASTATIN CALCIUM 80 MG/1
1 TABLET, FILM COATED ORAL
Qty: 0 | Refills: 0 | DISCHARGE
Start: 2019-12-30

## 2019-12-30 RX ADMIN — Medication 81 MILLIGRAM(S): at 09:47

## 2019-12-30 RX ADMIN — AMLODIPINE BESYLATE 5 MILLIGRAM(S): 2.5 TABLET ORAL at 05:43

## 2019-12-30 RX ADMIN — ATORVASTATIN CALCIUM 80 MILLIGRAM(S): 80 TABLET, FILM COATED ORAL at 21:20

## 2019-12-30 RX ADMIN — CLOPIDOGREL BISULFATE 75 MILLIGRAM(S): 75 TABLET, FILM COATED ORAL at 09:47

## 2019-12-30 RX ADMIN — HEPARIN SODIUM 5000 UNIT(S): 5000 INJECTION INTRAVENOUS; SUBCUTANEOUS at 21:20

## 2019-12-30 NOTE — DISCHARGE NOTE PROVIDER - HOSPITAL COURSE
63 yo [place] Male, HTN, HLD, ETOH use last drink 12/15 (reported WD seizure 12/14) presented with CP concern for unstable angina.             Cath; 12/30 [facility]: pLAD 80%, pLCx 50%, mLCx 90%, pOM1 99%, mRCA 100%, EF 55%, RFA accessed        Pt to be transferrred to [facility] for CABG evaluation.

## 2019-12-30 NOTE — DISCHARGE NOTE PROVIDER - NSDCCPCAREPLAN_GEN_ALL_CORE_FT
PRINCIPAL DISCHARGE DIAGNOSIS  Diagnosis: Chest pain  Assessment and Plan of Treatment: CAD found on cardiac cath today, transfer to [facility] for CABG evaluation, ASA to continue

## 2019-12-30 NOTE — DISCHARGE NOTE PROVIDER - CARE PROVIDER_API CALL
[facility],   Transfer to [facility]  Phone: (   )    -  Fax: (   )    -  Follow Up Time:

## 2019-12-30 NOTE — PROGRESS NOTE ADULT - SUBJECTIVE AND OBJECTIVE BOX
Subjective: Patient seen and examined. No new events except as noted.     SUBJECTIVE/ROS:  No chest pain, dyspnea, palpitation, or dizziness.       MEDICATIONS:  MEDICATIONS  (STANDING):  amLODIPine   Tablet 5 milliGRAM(s) Oral daily  aspirin enteric coated 81 milliGRAM(s) Oral daily  atorvastatin 80 milliGRAM(s) Oral at bedtime  clopidogrel Tablet 75 milliGRAM(s) Oral daily  heparin  Injectable 5000 Unit(s) SubCutaneous every 8 hours  influenza   Vaccine 0.5 milliLiter(s) IntraMuscular once  nicotine - 21 mG/24Hr(s) Patch 1 patch Transdermal daily      PHYSICAL EXAM:  T(C): 36.6 (12-30-19 @ 05:41), Max: 36.6 (12-29-19 @ 22:35)  HR: 72 (12-30-19 @ 05:41) (72 - 78)  BP: 121/84 (12-30-19 @ 05:41) (113/72 - 127/80)  RR: 18 (12-30-19 @ 05:41) (16 - 18)  SpO2: 99% (12-30-19 @ 05:41) (99% - 100%)  Wt(kg): --  I&O's Summary          JVP: Normal  Neck: supple  Lung: clear   CV: S1 S2 , Murmur:  Abd: soft  Ext: No edema  neuro: Awake / alert  Psych: flat affect  Skin: normal``    LABS/DATA:    CARDIAC MARKERS:  CARDIAC MARKERS ( 28 Dec 2019 13:59 )  x     / x     / 186 u/L / 2.16 ng/mL / x                                    12.1   7.32  )-----------( 307      ( 30 Dec 2019 06:40 )             37.1     12-30    138  |  101  |  19  ----------------------------<  90  4.1   |  25  |  0.89    Ca    9.6      30 Dec 2019 06:40  Mg     2.0     12-30    TPro  7.6  /  Alb  4.6  /  TBili  < 0.2<L>  /  DBili  x   /  AST  33  /  ALT  30  /  AlkPhos  71  12-28    proBNP:   Lipid Profile:   HgA1c:   TSH:     TELE:  EKG:

## 2019-12-30 NOTE — DISCHARGE NOTE PROVIDER - NSDCMRMEDTOKEN_GEN_ALL_CORE_FT
amLODIPine 5 mg oral tablet: 1 tab(s) orally once a day  aspirin 81 mg oral delayed release tablet: 1 tab(s) orally once a day  atorvastatin 80 mg oral tablet: 1 tab(s) orally once a day (at bedtime)  heparin: 5000 unit(s) subcutaneously every 8 hours  nicotine 21 mg/24 hr transdermal film, extended release: 21 milligram(s) transdermal once a day

## 2019-12-30 NOTE — DISCHARGE NOTE NURSING/CASE MANAGEMENT/SOCIAL WORK - PATIENT PORTAL LINK FT
You can access the FollowMyHealth Patient Portal offered by [facility] by registering at the following website: [email or web address] By joining [email or web address] FollowMyHealth portal, you will also be able to view your health information using other applications (apps) compatible with our system.

## 2019-12-30 NOTE — DISCHARGE NOTE NURSING/CASE MANAGEMENT/SOCIAL WORK - NSDCPEEMAIL_GEN_ALL_CORE
[facility] for Tobacco Control email [email or web address]

## 2019-12-30 NOTE — DISCHARGE NOTE NURSING/CASE MANAGEMENT/SOCIAL WORK - NSDCPEWEB_GEN_ALL_CORE
[place] website --- [email or web address] Tobacco [email or web address] website --- [email or web address]

## 2019-12-31 ENCOUNTER — INPATIENT (INPATIENT)
Facility: HOSPITAL | Age: 64
LOS: 5 days | Discharge: HOME HEALTH | DRG: 235 | End: 2020-01-06
Attending: THORACIC SURGERY (CARDIOTHORACIC VASCULAR SURGERY) | Admitting: THORACIC SURGERY (CARDIOTHORACIC VASCULAR SURGERY)
Payer: MEDICAID

## 2019-12-31 DIAGNOSIS — J43.2 CENTRILOBULAR EMPHYSEMA: ICD-10-CM

## 2019-12-31 DIAGNOSIS — E78.5 HYPERLIPIDEMIA, UNSPECIFIED: ICD-10-CM

## 2019-12-31 DIAGNOSIS — I25.10 ATHEROSCLEROTIC HEART DISEASE OF NATIVE CORONARY ARTERY WITHOUT ANGINA PECTORIS: ICD-10-CM

## 2019-12-31 DIAGNOSIS — Z87.891 PERSONAL HISTORY OF NICOTINE DEPENDENCE: ICD-10-CM

## 2019-12-31 DIAGNOSIS — K21.0 GASTRO-ESOPHAGEAL REFLUX DISEASE WITH ESOPHAGITIS: ICD-10-CM

## 2019-12-31 PROBLEM — I10 ESSENTIAL (PRIMARY) HYPERTENSION: Chronic | Status: ACTIVE | Noted: 2019-12-28

## 2019-12-31 PROBLEM — Z00.00 ENCOUNTER FOR PREVENTIVE HEALTH EXAMINATION: Status: ACTIVE | Noted: 2019-12-31

## 2019-12-31 LAB
ALBUMIN SERPL ELPH-MCNC: 4.7 G/DL — SIGNIFICANT CHANGE UP (ref 3.3–5)
ALP SERPL-CCNC: 73 U/L — SIGNIFICANT CHANGE UP (ref 40–120)
ALT FLD-CCNC: 26 U/L — SIGNIFICANT CHANGE UP (ref 10–45)
ANION GAP SERPL CALC-SCNC: 14 MMOL/L — SIGNIFICANT CHANGE UP (ref 5–17)
APPEARANCE UR: CLEAR — SIGNIFICANT CHANGE UP
APTT BLD: 42.5 SEC — HIGH (ref 27.5–36.3)
AST SERPL-CCNC: 26 U/L — SIGNIFICANT CHANGE UP (ref 10–40)
BASOPHILS # BLD AUTO: 0.07 K/UL — SIGNIFICANT CHANGE UP (ref 0–0.2)
BASOPHILS NFR BLD AUTO: 0.9 % — SIGNIFICANT CHANGE UP (ref 0–2)
BILIRUB SERPL-MCNC: 0.3 MG/DL — SIGNIFICANT CHANGE UP (ref 0.2–1.2)
BILIRUB UR-MCNC: NEGATIVE — SIGNIFICANT CHANGE UP
BLD GP AB SCN SERPL QL: NEGATIVE — SIGNIFICANT CHANGE UP
BUN SERPL-MCNC: 17 MG/DL — SIGNIFICANT CHANGE UP (ref 7–23)
CALCIUM SERPL-MCNC: 10 MG/DL — SIGNIFICANT CHANGE UP (ref 8.4–10.5)
CHLORIDE SERPL-SCNC: 97 MMOL/L — SIGNIFICANT CHANGE UP (ref 96–108)
CO2 SERPL-SCNC: 25 MMOL/L — SIGNIFICANT CHANGE UP (ref 22–31)
COLOR SPEC: SIGNIFICANT CHANGE UP
CREAT SERPL-MCNC: 0.93 MG/DL — SIGNIFICANT CHANGE UP (ref 0.5–1.3)
DIFF PNL FLD: NEGATIVE — SIGNIFICANT CHANGE UP
EOSINOPHIL # BLD AUTO: 0.23 K/UL — SIGNIFICANT CHANGE UP (ref 0–0.5)
EOSINOPHIL NFR BLD AUTO: 3 % — SIGNIFICANT CHANGE UP (ref 0–6)
GLUCOSE SERPL-MCNC: 89 MG/DL — SIGNIFICANT CHANGE UP (ref 70–99)
GLUCOSE UR QL: NEGATIVE — SIGNIFICANT CHANGE UP
HBA1C BLD-MCNC: 5.2 % — SIGNIFICANT CHANGE UP (ref 4–5.6)
HCT VFR BLD CALC: 42.9 % — SIGNIFICANT CHANGE UP (ref 39–50)
HGB BLD-MCNC: 13.9 G/DL — SIGNIFICANT CHANGE UP (ref 13–17)
IMM GRANULOCYTES NFR BLD AUTO: 0.8 % — SIGNIFICANT CHANGE UP (ref 0–1.5)
INR BLD: 1.04 RATIO — SIGNIFICANT CHANGE UP (ref 0.88–1.16)
KETONES UR-MCNC: NEGATIVE — SIGNIFICANT CHANGE UP
LEUKOCYTE ESTERASE UR-ACNC: NEGATIVE — SIGNIFICANT CHANGE UP
LYMPHOCYTES # BLD AUTO: 2.12 K/UL — SIGNIFICANT CHANGE UP (ref 1–3.3)
LYMPHOCYTES # BLD AUTO: 27.6 % — SIGNIFICANT CHANGE UP (ref 13–44)
MCHC RBC-ENTMCNC: 32.4 GM/DL — SIGNIFICANT CHANGE UP (ref 32–36)
MCHC RBC-ENTMCNC: 32.9 PG — SIGNIFICANT CHANGE UP (ref 27–34)
MCV RBC AUTO: 101.4 FL — HIGH (ref 80–100)
MONOCYTES # BLD AUTO: 0.72 K/UL — SIGNIFICANT CHANGE UP (ref 0–0.9)
MONOCYTES NFR BLD AUTO: 9.4 % — SIGNIFICANT CHANGE UP (ref 2–14)
MRSA PCR RESULT.: SIGNIFICANT CHANGE UP
NEUTROPHILS # BLD AUTO: 4.47 K/UL — SIGNIFICANT CHANGE UP (ref 1.8–7.4)
NEUTROPHILS NFR BLD AUTO: 58.3 % — SIGNIFICANT CHANGE UP (ref 43–77)
NITRITE UR-MCNC: NEGATIVE — SIGNIFICANT CHANGE UP
NRBC # BLD: 0 /100 WBCS — SIGNIFICANT CHANGE UP (ref 0–0)
PA ADP PRP-ACNC: 131 PRU — LOW (ref 194–417)
PH UR: 6 — SIGNIFICANT CHANGE UP (ref 5–8)
PLATELET # BLD AUTO: 324 K/UL — SIGNIFICANT CHANGE UP (ref 150–400)
POTASSIUM SERPL-MCNC: 4.1 MMOL/L — SIGNIFICANT CHANGE UP (ref 3.5–5.3)
POTASSIUM SERPL-SCNC: 4.1 MMOL/L — SIGNIFICANT CHANGE UP (ref 3.5–5.3)
PROT SERPL-MCNC: 7.8 G/DL — SIGNIFICANT CHANGE UP (ref 6–8.3)
PROT UR-MCNC: NEGATIVE — SIGNIFICANT CHANGE UP
PROTHROM AB SERPL-ACNC: 12 SEC — SIGNIFICANT CHANGE UP (ref 10–12.9)
RBC # BLD: 4.23 M/UL — SIGNIFICANT CHANGE UP (ref 4.2–5.8)
RBC # FLD: 12.7 % — SIGNIFICANT CHANGE UP (ref 10.3–14.5)
RH IG SCN BLD-IMP: POSITIVE — SIGNIFICANT CHANGE UP
S AUREUS DNA NOSE QL NAA+PROBE: SIGNIFICANT CHANGE UP
SODIUM SERPL-SCNC: 136 MMOL/L — SIGNIFICANT CHANGE UP (ref 135–145)
SP GR SPEC: 1.03 — HIGH (ref 1.01–1.02)
UROBILINOGEN FLD QL: NEGATIVE — SIGNIFICANT CHANGE UP
WBC # BLD: 7.67 K/UL — SIGNIFICANT CHANGE UP (ref 3.8–10.5)
WBC # FLD AUTO: 7.67 K/UL — SIGNIFICANT CHANGE UP (ref 3.8–10.5)

## 2019-12-31 PROCEDURE — ZZZZZ: CPT

## 2019-12-31 PROCEDURE — 71045 X-RAY EXAM CHEST 1 VIEW: CPT | Mod: 26

## 2019-12-31 PROCEDURE — 93880 EXTRACRANIAL BILAT STUDY: CPT | Mod: 26

## 2019-12-31 PROCEDURE — 94010 BREATHING CAPACITY TEST: CPT | Mod: 26

## 2019-12-31 PROCEDURE — 93306 TTE W/DOPPLER COMPLETE: CPT | Mod: 26

## 2019-12-31 PROCEDURE — 71250 CT THORAX DX C-: CPT | Mod: 26

## 2019-12-31 PROCEDURE — 99222 1ST HOSP IP/OBS MODERATE 55: CPT

## 2019-12-31 RX ORDER — INFLUENZA VIRUS VACCINE 15; 15; 15; 15 UG/.5ML; UG/.5ML; UG/.5ML; UG/.5ML
0.5 SUSPENSION INTRAMUSCULAR ONCE
Refills: 0 | Status: DISCONTINUED | OUTPATIENT
Start: 2019-12-31 | End: 2020-01-06

## 2019-12-31 RX ORDER — CEFUROXIME AXETIL 250 MG
1500 TABLET ORAL ONCE
Refills: 0 | Status: COMPLETED | OUTPATIENT
Start: 2019-12-31 | End: 2019-12-31

## 2019-12-31 RX ORDER — ATORVASTATIN CALCIUM 80 MG/1
80 TABLET, FILM COATED ORAL AT BEDTIME
Refills: 0 | Status: DISCONTINUED | OUTPATIENT
Start: 2019-12-31 | End: 2020-01-02

## 2019-12-31 RX ORDER — SODIUM CHLORIDE 9 MG/ML
3 INJECTION INTRAMUSCULAR; INTRAVENOUS; SUBCUTANEOUS EVERY 8 HOURS
Refills: 0 | Status: DISCONTINUED | OUTPATIENT
Start: 2019-12-31 | End: 2020-01-02

## 2019-12-31 RX ORDER — CHLORHEXIDINE GLUCONATE 213 G/1000ML
15 SOLUTION TOPICAL ONCE
Refills: 0 | Status: COMPLETED | OUTPATIENT
Start: 2019-12-31 | End: 2020-01-02

## 2019-12-31 RX ORDER — CHLORHEXIDINE GLUCONATE 213 G/1000ML
1 SOLUTION TOPICAL ONCE
Refills: 0 | Status: COMPLETED | OUTPATIENT
Start: 2019-12-31 | End: 2019-12-31

## 2019-12-31 RX ORDER — METOPROLOL TARTRATE 50 MG
25 TABLET ORAL
Refills: 0 | Status: DISCONTINUED | OUTPATIENT
Start: 2019-12-31 | End: 2019-12-31

## 2019-12-31 RX ORDER — ASPIRIN/CALCIUM CARB/MAGNESIUM 324 MG
81 TABLET ORAL DAILY
Refills: 0 | Status: DISCONTINUED | OUTPATIENT
Start: 2019-12-31 | End: 2020-01-02

## 2019-12-31 RX ADMIN — ATORVASTATIN CALCIUM 80 MILLIGRAM(S): 80 TABLET, FILM COATED ORAL at 21:48

## 2019-12-31 RX ADMIN — SODIUM CHLORIDE 3 MILLILITER(S): 9 INJECTION INTRAMUSCULAR; INTRAVENOUS; SUBCUTANEOUS at 13:19

## 2019-12-31 RX ADMIN — Medication 81 MILLIGRAM(S): at 11:38

## 2019-12-31 RX ADMIN — SODIUM CHLORIDE 3 MILLILITER(S): 9 INJECTION INTRAMUSCULAR; INTRAVENOUS; SUBCUTANEOUS at 05:57

## 2019-12-31 RX ADMIN — Medication 25 MILLIGRAM(S): at 17:03

## 2019-12-31 RX ADMIN — Medication 25 MILLIGRAM(S): at 05:59

## 2019-12-31 RX ADMIN — SODIUM CHLORIDE 3 MILLILITER(S): 9 INJECTION INTRAMUSCULAR; INTRAVENOUS; SUBCUTANEOUS at 21:48

## 2019-12-31 NOTE — H&P ADULT - HISTORY OF PRESENT ILLNESS
Two medical record numbers:  [id number] Two medical record numbers:  [facility]#: [id number] 63 yo Brazilian, English speaking male with pmh of HTN, HLD, everyday tobacco use, ETOH abuse with consumption of roughly a quart of hard liquor daily, and syncopal episode on 12/15 while on vacation in [place].  The seizure was observed by family and was associated with foaming at the mouth and resulted in a tongue laceration.  Suspect possibly ETOH withdrawal seizure.  Work up with CT-Head and CT-Angio were negative and reports are on chart for review.  In addition, he was diagnosed with NSTEMI, refused further evaluation and  left AMA.  He presented to [facility] on 12/28/19 after experiencing left sided chest pain which lasted roughly 15 minutes and was associated with radiation to the left arm, SOB, and dizziness.   He underwent cardiac cath and was found to have multivessel CAD.   Transferred to [facility] for surgical intervention.   Currently, he is chest pain free and denies shortness of breath, jaw pain, dizziness, headache, nausea.   He also states that he was being treated for high blood pressure in [facility] but stopped taking his medication as he felt better.   Received 75mg plavix on 12/30 prior to cath  Two medical record numbers:  [facility]#: [id number]

## 2019-12-31 NOTE — H&P ADULT - NSHPREVIEWOFSYSTEMS_GEN_ALL_CORE
Review of Systems: negative   Constitutional: [ ] fever, [ ]weight loss,  [ ]fatigue  Eyes: [ ] visual changes  Respiratory: [ ]shortness of breath;  [ ] cough, [ ]wheezing, [ ]chills, [ ]hemoptysis  Cardiovascular: [ ] chest pain, [ ]palpitations, [ ]dizziness,  [ ]leg swelling[ ]orthopnea[ ]PND  Gastrointestinal: [ ] abdominal pain, [ ]nausea, [ ]vomiting,  [ ]diarrhea   Genitourinary: [ ] dysuria, [ ] hematuria  Neurologic: [ ] headaches [ ] tremors[ ]weakness  Skin: [ ] itching, [ ]burning, [ ] rashes  Endocrine: [ ] heat or cold intolerance  Musculoskeletal: [ ] joint pain or swelling; [ ] muscle, back, or extremity pain  Psychiatric: [ ] depression, [ ]anxiety, [ ]mood swings, or [ ]difficulty sleeping  Hematologic: [ ] easy bruising, [ ] bleeding gums  All other systems negative except as noted

## 2019-12-31 NOTE — SBIRT NOTE ADULT - NSSBIRTBRIEFINTDET_GEN_A_CORE
results reviewed. Patient reports no concerns with his drinking. Patient declined substance misuse resources and reports he will work on his use on his own.

## 2019-12-31 NOTE — H&P ADULT - NSHPPHYSICALEXAM_GEN_ALL_CORE
General: Well nourished, well developed, no acute distress.                                                         Neuro: Normal exam oriented to person/place & time with no focal motor or sensory  deficits.                    Eyes: Normal exam of conjunctiva & lids, pupils equally reactive.   ENT: Normal exam of nasal/oral mucosa with absence of cyanosis.   Neck: Normal exam of jugular veins, trachea & thyroid.   Chest: Normal lung exam with good air movement absence of wheezes, rales, or rhonchi:                                                                          CV:  Auscultation: normal [ x] S3[ ] S4[ ] Irregular [ ] Rub[ ] Clicks[ ]    Murmurs none:[x]systolic [ ]  diastolic [ ] holosystolic [ ]  Carotids: No Bruits[x ] Other:                  Abdominal Aorta: normal [x ] nonpalpable[x]                                                                         GI: Soft, non-tender, non-distended, liver & spleen with no noted masses or tenderness. Bowel sounds active in all 4 quadrants                                                                                             Extremities: Normal no evidence of cyanosis or deformity Edema: none[ x]trace[ ]1+[ ]2+[ ]3+[ ]4+[ ]  Lower Extremity Pulses: Right[ +2   /4] Left[ +2 /4 ] Varicosities: none  SKIN :  Normal exam to inspection & palpation.  No rashes, breakdown

## 2019-12-31 NOTE — H&P ADULT - ASSESSMENT
65 yo Singaporean male with HTN, HLD, syncope vs seizure, Current everyday tobacco use, and ETOH abuse with last drink 12/15 who presented with chest pain with work up revealing multivessel CAD. Transferred to [facility] to undergo CABG first case.  Patient agreeable to surgical intervention.

## 2019-12-31 NOTE — PATIENT PROFILE ADULT - DO YOU FEEL UNSAFE AT SCHOOL?
Subjective:       Patient ID: Diana Arriaga is a 44 y.o. female who was referred by No ref. provider found    Chief Complaint:   Chief Complaint   Patient presents with    Cystitis     f/u to set up procedure        Interstitial Cystitis  She has known issues with Interstitial Cystitis for the past several years. She has tried Elmiron TID in the past but stopped this medication d/t hair loss. She has also tried bladder instillations in the past which were painful and did not help and hydrodistention. She went once to pain management.  She tries to adhere to IC diet. She tells me that she has significant improvement in symptoms with hydrodistention. Most recently she underwent cystoscopy with hydrodistention on 2018.      She is here today to schedule another hydrodistention. She reports being very stress lately and feels her IC is flaring d/t stress  ACTIVE MEDICAL ISSUES:  Patient Active Problem List   Diagnosis    Chronic interstitial cystitis    Routine gynecological examination    IC (interstitial cystitis)    Endometriosis    Pelvic pain in female    Status post hysterectomy    Osteopenia    Menopausal state    Breast mass    Right upper quadrant abdominal pain    Interstitial cystitis       PAST MEDICAL HISTORY  Past Medical History:   Diagnosis Date    Anxiety     Back pain     Cystitis     Depression     Migraine headache     Osteopenia        PAST SURGICAL HISTORY:  Past Surgical History:   Procedure Laterality Date    APPENDECTOMY      BIOPSY-EXCISIONAL with wire localization, pt to arrive mammography for wire before procedure (CONSENT AM OF) 1.0 hr case Left 2017    Performed by [facility]lower [facility] at [facility] OR    BREAST BIOPSY Left     fibroadenoma    breast cyst removed      Lt breast     SECTION  , 1993    x2    CYSTO WITH HYDRODISTENTION N/A 2018    Per[facility] by [facility]IE [facility]ias [facility] at [facility] OR    CYSTO, HYDRODISTENTION BLADDER, BLADDER BX N/A  3/19/2018    Per[facility] by [facility] at [facility] OR    CYSTOSCOPY WITH HYDRODISTENSION N/A 2017    [facility] at [facility] OR    CYSTOSCOPY WITH HYDRODISTENSION N/A 2017    [facility] by [facility] at [facility] OR    CYSTOSCOPY WITH RETROGRADE PYELOGRAM Bilateral 3/30/2015    [facility] at [facility] OR    CYSTOSCOPY, WITH BLADDER HYDRODISTENSION N/A 2018    [facility] at [facility] OR    CYSTOSCOPY, WITH BLADDER HYDRODISTENSION N/A 2018    Per[facility] by [facility] at [facility] OR    CYSTOSCOPY,WITH BLADDER HYDRODISTENSION N/A 2018    [facility] at [facility] OR    CYSTOSCOPY,WITH BLADDER HYDRODISTENSION N/A 2018    [facility] at [facility] OR    hydrodistention      HYSTERECTOMY      heavy periods, endometriosis, benign reasons    LASER LAPAROSCOPY      x2    OOPHORECTOMY         SOCIAL HISTORY:  Social History     Tobacco Use    Smoking status: Former Smoker     Packs/day: 0.25     Years: 25.00     Pack years: 6.25     Last attempt to quit: 2018     Years since quittin.3    Smokeless tobacco: Never Used   Substance Use Topics    Alcohol use: Yes     Comment: social    Drug use: No       FAMILY HISTORY:  Family History   Problem Relation Age of Onset    Cancer Mother 60        breast    Diabetes Mother     Breast cancer Mother     Diabetes Maternal Grandmother     Cancer Maternal Grandmother         lung    Stroke Maternal Grandfather     Heart disease Paternal Grandfather     Cancer Sister 40        ovarian    Diabetes Sister     Heart disease Sister     Kidney disease Sister     Ovarian cancer Sister     Cancer Maternal Aunt         laryngeal    Ovarian cancer Paternal Aunt     Breast cancer Other     Breast cancer Other     Breast cancer Other        ALLERGIES AND MEDICATIONS: updated and reviewed.  Review of patient's allergies indicates:  "  Allergen Reactions    Robaxin [methocarbamol] Other (See Comments)     States "feels like I have creepy crawlers down my legs "    Trazodone Anxiety     Nightmares, restless leg, aggitation    Vistaril [hydroxyzine hcl]      Current Outpatient Medications   Medication Sig    AA/prot/lysine/methio/vit C/B6 (A/G PRO ORAL) Take 1 tablet by mouth.    AFLURIA QUAD [id number], PF, 60 mcg/0.5 mL vaccine TO BE ADMINISTERED BY PHARMACIST FOR IMMUNIZATION    biotin 1 mg Cap Take by mouth.    CALCIUM/D3/MAG OX//MALDONADO/ZN (CALTRATE + D3 PLUS MINERALS ORAL) Take 1 tablet by mouth once daily.    clonazePAM (KLONOPIN) 2 MG Tab TAKE 1 TABLET BY MOUTH TWICE A DAY AS NEEDED ANXIETY    dextroamphetamine-amphetamine (ADDERALL) 20 mg tablet TAKE 1 TABLET BY MOUTH EVERY MORNING AND A HALF TABLET EVERY EVENING    escitalopram oxalate (LEXAPRO) 20 MG tablet Take 20 mg by mouth once daily.    gabapentin (NEURONTIN) 400 MG capsule TAKE 1 CAPSULE (400 MG) BY ORAL ROUTE 3 TIMES PER DAY PRN    ibuprofen (ADVIL,MOTRIN) 600 MG tablet TAKE 1 TABLET BY ORAL ROUTE 2 TIMES PER DAY WITH FOOD AS NEEDED    oxyCODONE-acetaminophen (PERCOCET)  mg per tablet Take 1 tablet by mouth every 4 (four) hours as needed for Pain.    phenazopyridine (PYRIDIUM) 200 MG tablet Take 1 tablet (200 mg total) by mouth 3 (three) times daily as needed for Pain (Burning).    ranitidine (ZANTAC) 150 MG tablet TAKE 1 TABLET (150 MG) BY ORAL ROUTE ONCE DAILY AT BEDTIME AS NEEDED    zolpidem (AMBIEN) 10 mg Tab Take 10 mg by mouth every evening.     No current facility-administered medications for this visit.        Review of Systems   Constitutional: Negative for activity change, fatigue, fever and unexpected weight change.   Eyes: Negative for redness and visual disturbance.   Respiratory: Negative for chest tightness and shortness of breath.    Cardiovascular: Negative for chest pain and leg swelling.   Gastrointestinal: Negative for abdominal " "distention, abdominal pain, constipation, diarrhea, nausea and vomiting.   Genitourinary: Negative for difficulty urinating, dysuria, flank pain, frequency, hematuria, pelvic pain, urgency and vaginal bleeding.   Musculoskeletal: Negative for arthralgias and joint swelling.   Neurological: Negative for dizziness, weakness and headaches.   Psychiatric/Behavioral: Negative for confusion. The patient is not nervous/anxious.    All other systems reviewed and are negative.      Objective:      Vitals:    12/05/18 1421   BP: 110/70   Pulse: 62   Weight: 75.8 kg (167 lb 1.7 oz)   Height: 5' 2" (1.575 m)     Physical Exam   Nursing note and vitals reviewed.  Constitutional: She is oriented to person, place, and time. She appears well-developed.   HENT:   Head: Normocephalic.   Eyes: Conjunctivae are normal.   Neck: Normal range of motion. No tracheal deviation present. No thyromegaly present.   Cardiovascular: Normal rate, normal heart sounds and normal pulses.    Pulmonary/Chest: Effort normal and breath sounds normal. No respiratory distress. She has no wheezes.   Abdominal: Soft. She exhibits no distension and no mass. There is no hepatosplenomegaly. There is no tenderness. There is no rebound, no guarding and no CVA tenderness. No hernia.   Musculoskeletal: Normal range of motion. She exhibits no edema or tenderness.   Lymphadenopathy:     She has no cervical adenopathy.   Neurological: She is alert and oriented to person, place, and time.   Skin: Skin is warm and dry. No rash noted. No erythema.     Psychiatric: She has a normal mood and affect. Her behavior is normal. Judgment and thought content normal.       Urine dipstick shows negative for all components.  Micro exam: negative for WBC's or RBC's.    Assessment:       1. Chronic interstitial cystitis    2. Nocturia more than twice per night    3. Urinary frequency    4. IC (interstitial cystitis)          Plan:       1. Chronic interstitial cystitis  Cystoscopy " with hydrodistention on Monday 12/17/2018.    2. Nocturia more than twice per night  stable    3. Urinary frequency  stable    4. IC (interstitial cystitis)    - oxyCODONE-acetaminophen (PERCOCET)  mg per tablet; Take 1 tablet by mouth every 4 (four) hours as needed for Pain.  Dispense: 30 tablet; Refill: 0            Follow-up in about 6 weeks (around 1/16/2019) for Follow up.   not applicable

## 2019-12-31 NOTE — CHART NOTE - NSCHARTNOTEFT_GEN_A_CORE
BB dc'd for earlier episode of 2:1 heart block  will monitor and restart BB at 12.5 q 12 if stable rhythm     lmg NP [id number]

## 2019-12-31 NOTE — PROGRESS NOTE ADULT - SUBJECTIVE AND OBJECTIVE BOX
Cardiac Surgery Pre-op Note:    CC: Patient is a 64y old  Male who presents with a chief complaint of CABG (31 Dec 2019 00:11)      Referring Physician: Dr. Villagran/Dr. JASON Mayfield                                                                                                           Surgeon: Dr. ALHAJI Metzger    Procedure: 19 CABG x3    Allergies: No Known Allergies    HPI:  65 yo [place], English speaking male with pmh of HTN, HLD, everyday tobacco use, ETOH abuse with consumption of roughly a quart of hard liquor daily, and syncopal episode on 12/15 while on vacation in [place].  The seizure was observed by family and was associated with foaming at the mouth and resulted in a tongue laceration.  Suspect possibly ETOH withdrawal seizure.  Work up with CT-Head and CT-Angio were negative and reports are on chart for review.  In addition, he was diagnosed with NSTEMI, refused further evaluation and  left AMA.  He presented to [facility] on 19 after experiencing left sided chest pain which lasted roughly 15 minutes and was associated with radiation to the left arm, SOB, and dizziness.   He underwent cardiac cath and was found to have multivessel CAD.   Transferred to [facility] for surgical intervention.   Currently, he is chest pain free and denies shortness of breath, jaw pain, dizziness, headache, nausea.   He also states that he was being treated for high blood pressure in [facility] but stopped taking his medication as he felt better.   Received 75mg plavix on  prior to cath  Two medical record numbers:  [facility]#: [id number] (31 Dec 2019 00:11)      PAST MEDICAL & SURGICAL HISTORY:  Syncope  Cigarette smoker within last 12 months  ETOH abuse  Hyperlipidemia  HTN (hypertension)  CAD, multiple vessel  No significant past surgical history      MEDICATIONS  (STANDING):  aspirin  chewable 81 milliGRAM(s) Oral daily  atorvastatin 80 milliGRAM(s) Oral at bedtime  cefuroxime  IVPB 1500 milliGRAM(s) IV Intermittent once  chlorhexidine 0.12% Liquid 15 milliLiter(s) Swish and Spit once  chlorhexidine 4% Liquid 1 Application(s) Topical once  influenza   Vaccine 0.5 milliLiter(s) IntraMuscular once  metoprolol tartrate 25 milliGRAM(s) Oral two times a day  sodium chloride 0.9% lock flush 3 milliLiter(s) IV Push every 8 hours      Labs:                        13.9   7.67  )-----------( 324      ( 31 Dec 2019 00:56 )             42.9         136  |  97  |  17  ----------------------------<  89  4.1   |  25  |  0.93    Ca    10.0      31 Dec 2019 00:56    TPro  7.8  /  Alb  4.7  /  TBili  0.3  /  DBili  x   /  AST  26  /  ALT  26  /  AlkPhos  73      PT/INR - ( 31 Dec 2019 00:56 )   PT: 12.0 sec;   INR: 1.04 ratio    PTT - ( 31 Dec 2019 00:56 )  PTT:42.5 sec  P2Y12 131    Blood Type: ABO Interpretation: O ( @ 01:04)    HGB A1C:   Prealbumin:   Pro-BNP:   Thyroid Panel:   MRSA:  / MSSA: pending   Urinalysis Basic - ( 31 Dec 2019 00:56 )    Color: Light Yellow / Appearance: Clear / S.028 / pH: x  Gluc: x / Ketone: Negative  / Bili: Negative / Urobili: Negative   Blood: x / Protein: Negative / Nitrite: Negative   Leuk Esterase: Negative / RBC: x / WBC x   Sq Epi: x / Non Sq Epi: x / Bacteria: x        CXR: completed, pending official reading    EKG: on chart SR     Carotid Duplex:  ordered    PFT's: ordered     Echocardiogram: ordered     Cardiac catheterization:    Vein Mapping: not required     Gen: WN/WD NAD  Neuro: AAOx3, nonfocal  Pulm: CTA B/L  CV: RRR, S1S2  Abd: Soft, NT, ND +BS  Ext: No edema, + peripheral pulses      Pt has AICD/PPM [ ] Yes  [ x] No             Brand Name:  Pre-op Beta Blocker ordered within 24 hrs of surgery (CABG ONLY)?  [x ] Yes  [ ] No  If not, Why?  Type & Cross  [x ] Yes  [ ] No  NPO after Midnight [ x] Yes  [ ] No  Pre-op ABX ordered, to be taped on chart:  [x ] Yes  [ ] No     Hibiclens/Peridex ordered [x ] Yes  [ ] No  Intraop on Hold: PRBCs, CXR, DESTINEY [x ]   Consent obtained  [ ] Yes  [x ] No   On chart pending signature Cardiac Surgery Pre-op Note:    CC: Patient is a 64y old  Male who presents with a chief complaint of CABG (31 Dec 2019 00:11)      Referring Physician: Dr. Villagran/Dr. JASON Mayfield                                                                                                           Surgeon: Dr. ALHAJI Metzger    Procedure: 19 CABG x3    Allergies: No Known Allergies    HPI:  63 yo Cuban, English speaking male with pmh of HTN, HLD, everyday tobacco use, ETOH abuse with consumption of roughly a quart of hard liquor daily, and syncopal episode on 12/15 while on vacation in [place].  The seizure was observed by family and was associated with foaming at the mouth and resulted in a tongue laceration.  Suspect possibly ETOH withdrawal seizure.  Work up with CT-Head and CT-Angio were negative and reports are on chart for review.  In addition, he was diagnosed with NSTEMI, refused further evaluation and  left AMA.  He presented to [facility] on 19 after experiencing left sided chest pain which lasted roughly 15 minutes and was associated with radiation to the left arm, SOB, and dizziness.   He underwent cardiac cath and was found to have multivessel CAD.   Transferred to [facility] for surgical intervention.   Currently, he is chest pain free and denies shortness of breath, jaw pain, dizziness, headache, nausea.   He also states that he was being treated for high blood pressure in [facility] but stopped taking his medication as he felt better.   Received 75mg plavix on  prior to cath  Two medical record numbers:  [facility]#: [id number] (31 Dec 2019 00:11)      PAST MEDICAL & SURGICAL HISTORY:  Syncope  Cigarette smoker within last 12 months  ETOH abuse  Hyperlipidemia  HTN (hypertension)  CAD, multiple vessel  No significant past surgical history      MEDICATIONS  (STANDING):  aspirin  chewable 81 milliGRAM(s) Oral daily  atorvastatin 80 milliGRAM(s) Oral at bedtime  cefuroxime  IVPB 1500 milliGRAM(s) IV Intermittent once  chlorhexidine 0.12% Liquid 15 milliLiter(s) Swish and Spit once  chlorhexidine 4% Liquid 1 Application(s) Topical once  influenza   Vaccine 0.5 milliLiter(s) IntraMuscular once  metoprolol tartrate 25 milliGRAM(s) Oral two times a day  sodium chloride 0.9% lock flush 3 milliLiter(s) IV Push every 8 hours      Labs:                        13.9   7.67  )-----------( 324      ( 31 Dec 2019 00:56 )             42.9         136  |  97  |  17  ----------------------------<  89  4.1   |  25  |  0.93    Ca    10.0      31 Dec 2019 00:56    TPro  7.8  /  Alb  4.7  /  TBili  0.3  /  DBili  x   /  AST  26  /  ALT  26  /  AlkPhos  73      PT/INR - ( 31 Dec 2019 00:56 )   PT: 12.0 sec;   INR: 1.04 ratio    PTT - ( 31 Dec 2019 00:56 )  PTT:42.5 sec  P2Y12 131    Blood Type: ABO Interpretation: O ( @ 01:04)  Cholesterol: 62 Triglycerides: 59 HDL 88 LDL 64  HGB A1C: pending  Pro-BNP: 23  [facility] 2.39 [facility]  MRSA:  / MSSA: pending   Urinalysis Basic - ( 31 Dec 2019 00:56 )  Color: Light Yellow / Appearance: Clear / S.028 / pH: x  Gluc: x / Ketone: Negative  / Bili: Negative / Urobili: Negative   Blood: x / Protein: Negative / Nitrite: Negative   Leuk Esterase: Negative / RBC: x / WBC x   Sq Epi: x / Non Sq Epi: x / Bacteria: x    CXR: completed, pending official reading  EKG: on chart SR   Carotid Duplex:  ordered  PFT's: ordered   Echocardiogram: ordered     Cardiac catheterization: Cath: 19:  EF 55%  LM: Normal  Prox LAD: 80% Mid LAd: 40% Prox Cx: 70% Mid Circ: 90%  OM1: 99% Mid RCA: 100% supplied by collaterals  Vein Mapping: not required     Gen: WN/WD NAD  Neuro: AAOx3, nonfocal  Pulm: CTA B/L  CV: RRR, S1S2  Abd: Soft, NT, ND +BS  Ext: No edema, + peripheral pulses      Pt has AICD/PPM [ ] Yes  [ x] No             Brand Name:  Pre-op Beta Blocker ordered within 24 hrs of surgery (CABG ONLY)?  [x ] Yes  [ ] No  If not, Why?  Type & Cross  [x ] Yes  [ ] No  NPO after Midnight [ x] Yes  [ ] No  Pre-op ABX ordered, to be taped on chart:  [x ] Yes  [ ] No     Hibiclens/Peridex ordered [x ] Yes  [ ] No  Intraop on Hold: PRBCs, CXR, DESTINEY [x ]   Consent obtained  [ ] Yes  [x ] No   On chart pending signature

## 2019-12-31 NOTE — CHART NOTE - NSCHARTNOTEFT_GEN_A_CORE
Perry made w/ Dr. Anderson in to see pt  2:1 HB BRIEF THIS AM- BB d/c'd as per dr. anderson  will ck P2y12 in am Wednesday  p2y12 133 this am  pt & family made aware of reason for cancellation today  emotional support given  will continue to monitor closely

## 2019-12-31 NOTE — H&P ADULT - NSHPLABSRESULTS_GEN_ALL_CORE
13.9   7.67  )-----------( 324      ( 31 Dec 2019 00:56 )             42.9   12-31    136  |  97  |  17  ----------------------------<  89  4.1   |  25  |  0.93    Ca    10.0      31 Dec 2019 00:56    TPro  7.8  /  Alb  4.7  /  TBili  0.3  /  DBili  x   /  AST  26  /  ALT  26  /  AlkPhos  73  12-31    P2Y12 Plt Response Test . (12.31.19 @ 00:56)    P2Y12 Plt Reactivity: 131: Assay performance may be affected by Hematocrit values <33% or >53% or  Platelet count <119 or >502 K/ul. PRU    Hep C negative    TTE: pending    Cath: 12/30/19:  EF 55%  LM: Normal  Prox LAD: 80% Mid LAd: 40% Prox Cx: 70% Mid Circ: 90%  OM1: 99% Mid RCA: 100% supplied by collaterals 13.9   7.67  )-----------( 324      ( 31 Dec 2019 00:56 )             42.9   12-31    136  |  97  |  17  ----------------------------<  89  4.1   |  25  |  0.93    Ca    10.0      31 Dec 2019 00:56    TPro  7.8  /  Alb  4.7  /  TBili  0.3  /  DBili  x   /  AST  26  /  ALT  26  /  AlkPhos  73  12-31    P2Y12 Plt Response Test . (12.31.19 @ 00:56)    P2Y12 Plt Reactivity: 131: Assay performance may be affected by Hematocrit values <33% or >53% or  Platelet count <119 or >502 K/ul. PRU    Hep C negative    TTE: pending    Cath: 12/30/19:  EF 55%  LM: Normal  Prox LAD: 80% Mid LAd: 40% Prox Cx: 70% Mid Circ: 90%  OM1: 99% Mid RCA: 100% supplied by collaterals    CT Head: outside report summary 12/15/19  1. No Acute intracranial hemorrhage, mass effect, midline shift, hydrocephalus, or depressed calvarial fracture appreciated  2. There is white matter disease likely the result of chronic ischemic demyelination  3. Atherosclerotic calcification of the bilateral carotid siphons. 13.9   7.67  )-----------( 324      ( 31 Dec 2019 00:56 )             42.9   12-31    136  |  97  |  17  ----------------------------<  89  4.1   |  25  |  0.93    Ca    10.0      31 Dec 2019 00:56    TPro  7.8  /  Alb  4.7  /  TBili  0.3  /  DBili  x   /  AST  26  /  ALT  26  /  AlkPhos  73  12-31    P2Y12 Plt Response Test . (12.31.19 @ 00:56)    P2Y12 Plt Reactivity: 131: Assay performance may be affected by Hematocrit values <33% or >53% or  Platelet count <119 or >502 K/ul. PRU    Hep C negative    TTE: pending    Cath: 12/30/19:  EF 55%  LM: Normal  Prox LAD: 80% Mid LAd: 40% Prox Cx: 70% Mid Circ: 90%  OM1: 99% Mid RCA: 100% supplied by collaterals    CT Head: outside report summary 12/15/19  1. No Acute intracranial hemorrhage, mass effect, midline shift, hydrocephalus, or depressed calvarial fracture appreciated  2. There is white matter disease likely the result of chronic ischemic demyelination  3. Atherosclerotic calcification of the bilateral carotid siphons.    CT-Angio Chest: outside hospital report summary from 12/15/19  1) Hiatal hernia/thickening of the distal esophagus--reflux esophagitis  2) [place] nodules upper lobes/centrilobular ground glass ?smoking related bronchiolitis  3) multiple granulomas  4) emphysema  5) chronic mucoid impaction RLL and calcified granulomas  6) mild aneurysmal dilatation of the aortic root 40mm at sinuses of valsalva with mild ectasia of the ascending aorta measuring up to 37mm at the level of the right pulmonary artery

## 2019-12-31 NOTE — H&P ADULT - NSHPSOCIALHISTORY_GEN_ALL_CORE
SOCIAL HISTORY:  Smoker: [x ] Yes  [ ] No             Quit date: 12/15 1 ppd x 44 years  ETOH use: [x ] Yes  [ ] No              FREQUENCY / QUANTITY: 1 quart of hard liquor daily last drink 12/14  Ilicit Drug use:  [ ] Yes  [x ] No  Occupation: unemployed  Living arrangements: visiting from [facility]   Assist device use: none

## 2019-12-31 NOTE — H&P ADULT - NSICDXPASTMEDICALHX_GEN_ALL_CORE_FT
PAST MEDICAL HISTORY:  CAD, multiple vessel     Cigarette smoker within last 12 months     ETOH abuse     HTN (hypertension)     Hyperlipidemia     Syncope

## 2019-12-31 NOTE — H&P ADULT - PROBLEM SELECTOR PLAN 1
P2Y12: 131  Needs TTE  Preop ABX ordered  NPO after midnight  Continue with ASA  Initiate Beta blocker P2Y12: 131  Needs TTE/Carotid dopplers  Preop ABX ordered  NPO after midnight  Continue with ASA  Initiate Beta blocker

## 2020-01-01 ENCOUNTER — TRANSCRIPTION ENCOUNTER (OUTPATIENT)
Age: 65
End: 2020-01-01

## 2020-01-01 LAB
ANION GAP SERPL CALC-SCNC: 12 MMOL/L — SIGNIFICANT CHANGE UP (ref 5–17)
BLD GP AB SCN SERPL QL: NEGATIVE — SIGNIFICANT CHANGE UP
BUN SERPL-MCNC: 21 MG/DL — SIGNIFICANT CHANGE UP (ref 7–23)
CALCIUM SERPL-MCNC: 10.1 MG/DL — SIGNIFICANT CHANGE UP (ref 8.4–10.5)
CHLORIDE SERPL-SCNC: 98 MMOL/L — SIGNIFICANT CHANGE UP (ref 96–108)
CO2 SERPL-SCNC: 25 MMOL/L — SIGNIFICANT CHANGE UP (ref 22–31)
CREAT SERPL-MCNC: 0.95 MG/DL — SIGNIFICANT CHANGE UP (ref 0.5–1.3)
GLUCOSE SERPL-MCNC: 100 MG/DL — HIGH (ref 70–99)
HCT VFR BLD CALC: 39.8 % — SIGNIFICANT CHANGE UP (ref 39–50)
HGB BLD-MCNC: 13.4 G/DL — SIGNIFICANT CHANGE UP (ref 13–17)
MCHC RBC-ENTMCNC: 33.3 PG — SIGNIFICANT CHANGE UP (ref 27–34)
MCHC RBC-ENTMCNC: 33.7 GM/DL — SIGNIFICANT CHANGE UP (ref 32–36)
MCV RBC AUTO: 99 FL — SIGNIFICANT CHANGE UP (ref 80–100)
NRBC # BLD: 0 /100 WBCS — SIGNIFICANT CHANGE UP (ref 0–0)
PA ADP PRP-ACNC: 135 PRU — LOW (ref 194–417)
PLATELET # BLD AUTO: 318 K/UL — SIGNIFICANT CHANGE UP (ref 150–400)
POTASSIUM SERPL-MCNC: 4 MMOL/L — SIGNIFICANT CHANGE UP (ref 3.5–5.3)
POTASSIUM SERPL-SCNC: 4 MMOL/L — SIGNIFICANT CHANGE UP (ref 3.5–5.3)
RBC # BLD: 4.02 M/UL — LOW (ref 4.2–5.8)
RBC # FLD: 12.5 % — SIGNIFICANT CHANGE UP (ref 10.3–14.5)
RH IG SCN BLD-IMP: POSITIVE — SIGNIFICANT CHANGE UP
SODIUM SERPL-SCNC: 135 MMOL/L — SIGNIFICANT CHANGE UP (ref 135–145)
WBC # BLD: 8.8 K/UL — SIGNIFICANT CHANGE UP (ref 3.8–10.5)
WBC # FLD AUTO: 8.8 K/UL — SIGNIFICANT CHANGE UP (ref 3.8–10.5)

## 2020-01-01 RX ORDER — CEFUROXIME AXETIL 250 MG
1500 TABLET ORAL ONCE
Refills: 0 | Status: DISCONTINUED | OUTPATIENT
Start: 2020-01-01 | End: 2020-01-02

## 2020-01-01 RX ORDER — CHLORHEXIDINE GLUCONATE 213 G/1000ML
1 SOLUTION TOPICAL ONCE
Refills: 0 | Status: COMPLETED | OUTPATIENT
Start: 2020-01-01 | End: 2020-01-01

## 2020-01-01 RX ADMIN — CHLORHEXIDINE GLUCONATE 1 APPLICATION(S): 213 SOLUTION TOPICAL at 21:21

## 2020-01-01 RX ADMIN — SODIUM CHLORIDE 3 MILLILITER(S): 9 INJECTION INTRAMUSCULAR; INTRAVENOUS; SUBCUTANEOUS at 05:02

## 2020-01-01 RX ADMIN — Medication 81 MILLIGRAM(S): at 12:11

## 2020-01-01 RX ADMIN — SODIUM CHLORIDE 3 MILLILITER(S): 9 INJECTION INTRAMUSCULAR; INTRAVENOUS; SUBCUTANEOUS at 12:10

## 2020-01-01 RX ADMIN — SODIUM CHLORIDE 3 MILLILITER(S): 9 INJECTION INTRAMUSCULAR; INTRAVENOUS; SUBCUTANEOUS at 21:21

## 2020-01-01 RX ADMIN — ATORVASTATIN CALCIUM 80 MILLIGRAM(S): 80 TABLET, FILM COATED ORAL at 21:21

## 2020-01-01 NOTE — PROGRESS NOTE ADULT - SUBJECTIVE AND OBJECTIVE BOX
Cardiac Surgery Pre-op Note:  CC: Patient is a 64y old  Male who presents with a chief complaint of CABG (31 Dec 2019 02:46)      Referring Physician:                                                                                             Surgeon:  Procedure: (Date) (Procedure)    Allergies    No Known Allergies    Intolerances      HPI:  63 yo Iranian, English speaking male with pmh of HTN, HLD, everyday tobacco use, ETOH abuse with consumption of roughly a quart of hard liquor daily, and syncopal episode on 12/15 while on vacation in [place].  The seizure was observed by family and was associated with foaming at the mouth and resulted in a tongue laceration.  Suspect possibly ETOH withdrawal seizure.  Work up with CT-Head and CT-Angio were negative and reports are on chart for review.  In addition, he was diagnosed with NSTEMI, refused further evaluation and  left AMA.  He presented to [facility] on 19 after experiencing left sided chest pain which lasted roughly 15 minutes and was associated with radiation to the left arm, SOB, and dizziness.   He underwent cardiac cath and was found to have multivessel CAD.   Transferred to [facility] for surgical intervention.   Currently, he is chest pain free and denies shortness of breath, jaw pain, dizziness, headache, nausea.   He also states that he was being treated for high blood pressure in [facility] but stopped taking his medication as he felt better.   Received 75mg plavix on  prior to cath  Two medical record numbers:  [facility]#: [id number] (31 Dec 2019 00:11)      PAST MEDICAL & SURGICAL HISTORY:  Syncope  Cigarette smoker within last 12 months  ETOH abuse  Hyperlipidemia  HTN (hypertension)  CAD, multiple vessel  HLD (hyperlipidemia)  HTN (hypertension)  No significant past surgical history  No significant past surgical history      MEDICATIONS  (STANDING):  aspirin  chewable 81 milliGRAM(s) Oral daily  atorvastatin 80 milliGRAM(s) Oral at bedtime  cefuroxime  IVPB 1500 milliGRAM(s) IV Intermittent once  cefuroxime  IVPB 1500 milliGRAM(s) IV Intermittent once  chlorhexidine 0.12% Liquid 15 milliLiter(s) Swish and Spit once  chlorhexidine 4% Liquid 1 Application(s) Topical once  influenza   Vaccine 0.5 milliLiter(s) IntraMuscular once  sodium chloride 0.9% lock flush 3 milliLiter(s) IV Push every 8 hours    Labs:                        13.4   8.80  )-----------( 318      ( 2020 07:07 )             39.8     135  |  98  |  21  ----------------------------<  100<H>  4.0   |  25  |  0.95    Ca    10.1      2020 07:07    TPro  7.8  /  Alb  4.7  /  TBili  0.3  /  DBili  x   /  AST  26  /  ALT  26  /  AlkPhos  73      PT/INR - ( 31 Dec 2019 00:56 )   PT: 12.0 sec;   INR: 1.04 ratio      PTT - ( 31 Dec 2019 00:56 )  PTT:42.5 sec    Blood Type: ABO Interpretation: O ( @ 07:27)    HGB A1C: Hemoglobin A1C, Whole Blood: 5.2 % ( @ 03:24)    Pro-BNP: Serum Pro-Brain Natriuretic Peptide: 23.09 pg/mL ( @ 12:23)    Thyroid Panel:  @ 06:35/2.39 Thyroid Stimulating Hormone, Serum in AM (19 @ 06:35)    Thyroid Stimulating Hormone, Serum: 2.39 uIU/mL    MRSA: MRSA PCR Result.: NotDetec ( @ 03:20)   / MSSA:   Urinalysis Basic - ( 31 Dec 2019 00:56 )    Color: Light Yellow / Appearance: Clear / S.028 / pH: x  Gluc: x / Ketone: Negative  / Bili: Negative / Urobili: Negative   Blood: x / Protein: Negative / Nitrite: Negative   Leuk Esterase: Negative / RBC: x / WBC x   Sq Epi: x / Non Sq Epi: x / Bacteria: x    CXR: < from: Xray Chest 1 View AP/PA (19 @ 03:08) >  FINDINGS:    Clear lungs. No pleural effusions or pneumothorax. The heart is normal in size. Degenerative changes of the spine.    EKG: < from: 12 Lead ECG (19 @ 12:24) >  Diagnosis Line Sinus bradycardia  Left ventricular hypertrophy with repolarization abnormality  Abnormal ECG    Carotid Duplex:  < from: VA Duplex Carotid, Bilat (19 @ 14:43) >  IMPRESSION: No stenosis.    Measurement of carotid stenosis is based on velocity parameters that correlate the residual internal carotid diameter with that of the more distal vessel in accordance with a method such as the [employer] American Symptomatic Carotid Endarterectomy Trial (NASCET).    PFT's:    Echocardiogram:   Mitral Valve: Normal mitral valve. Minimal mitral  regurgitation.  Aortic Valve/Aorta: Mildly calcified aortic valve leaflets.   Minimal aortic regurgitation.  Normal aortic root size.  Left Atrium: Normal left atrium.  Left Ventricle: Normal left ventricular internal dimensions  and wall thicknesses.  Normal left ventricular systolic function. No segmental  wall motion abnormalities.  Normal diastolic function.  Right Heart: Normal right atrium. Normal right ventricular  size and function. Normal tricuspid valve. Mild tricuspid  regurgitation. Normal pulmonic valve. Minimal pulmonic  regurgitation.  Pericardium/Pleura: Normal pericardium with no pericardial  effusion.  Hemodynamic: Estimated right atrial pressure is mildly  increased.  No evidence of pulmonary hypertension.  No PFO seen with color Doppler.    cardiac catheterization:  < from: Cardiac Cath Lab - Adult (19 @ 15:25) >  CORONARY VESSELS: The coronary circulation is right dominant.  LM:   --  LM: Normal.  LAD:   --  Proximal LAD: There was a discrete 80 % stenosis. There was WES  grade3 flow through the vessel (brisk flow).  --  Mid LAD: There was a tubular 40 % stenosis in the proximal third of the  vessel segment.  CX:   --  Proximal circumflex: There was a tubular 70 % stenosis. There was  WES grade 3 flow through the vessel (brisk flow).  --  Mid circumflex: There was a discrete 90 % stenosis just before OM2.  There was WES grade 3 flow through the vessel (brisk flow).  --  OM1: The distal vessel was supplied by collaterals from the second  obtuse marginal. There was a discrete 99 % stenosis. There was WES grade  1 flow through the vessel (slow flow without perfusion).  RCA:   --  Mid RCA: The distal vessel was supplied by collaterals from  septal branches of LAD. There was a 100 % stenosis. There was WES grade 0  flow through the vessel (no flow).    Gen: WN/WD NAD  Neuro: AAOx3, nonfocal  Pulm: CTA B/L< from: Transthoracic Echocardiogram (19 @ 07:01)   CV: RRR, S1S2 +systolic murmur  Abd: Soft, NT, ND +BS  Ext: No edema, + peripheral pulses    Pt has AICD/PPM [ ] Yes  [x ] No             Brand Name:  Pre-op Beta Blocker ordered within 24 hrs of surgery (CABG ONLY)?  [x ] Yes  [ ] No  If not, Why?  Type & Cross  [x ] Yes  [ ] No  NPO after Midnight [x ] Yes  [ ] No  Pre-op ABX ordered, to be taped on chart:  [ x] Yes  [ ] No     Hibiclens/Peridex ordered [x ] Yes  [ ] No  Intraop on Hold: PRBCs, CXR, DESTINEY [x ]   Consent obtained  [x ] Yes  [ ] No

## 2020-01-01 NOTE — PRE-ANESTHESIA EVALUATION ADULT - NSRADCARDRESULTSFT_GEN_ALL_CORE
echo 12/31/19 - no significant valvulopathy, normal LV systolic and diastolic function  LCH 12/30/19 - triple vessel disease, no left main disease

## 2020-01-01 NOTE — PROGRESS NOTE ADULT - ASSESSMENT
65 yo [place]ian male with HTN, HLD, syncope vs seizure, Current everyday tobacco use, and ETOH abuse with last drink 12/15 who presented with chest pain with work up revealing multivessel CAD. Transferred to [facility] to undergo CABG first case.  Patient agreeable to surgical intervention.  P2y12 = 135 this 1/1/20  spoke with Felix Metzger - pt is cleared for cabg in am thursday-1st case.

## 2020-01-01 NOTE — PROGRESS NOTE ADULT - PROBLEM SELECTOR PLAN 1
P2Y12: 135  TTE/Carotid dopplers  Preop ABX ordered  NPO after midnight  Continue with ASA  Initiate Beta blocker

## 2020-01-02 ENCOUNTER — APPOINTMENT (OUTPATIENT)
Dept: CARDIOTHORACIC SURGERY | Facility: HOSPITAL | Age: 65
End: 2020-01-02

## 2020-01-02 LAB
ALBUMIN SERPL ELPH-MCNC: 3.9 G/DL — SIGNIFICANT CHANGE UP (ref 3.3–5)
ALP SERPL-CCNC: 50 U/L — SIGNIFICANT CHANGE UP (ref 40–120)
ALT FLD-CCNC: 15 U/L — SIGNIFICANT CHANGE UP (ref 10–45)
ANION GAP SERPL CALC-SCNC: 12 MMOL/L — SIGNIFICANT CHANGE UP (ref 5–17)
APTT BLD: 33.5 SEC — SIGNIFICANT CHANGE UP (ref 27.5–36.3)
AST SERPL-CCNC: 26 U/L — SIGNIFICANT CHANGE UP (ref 10–40)
BASOPHILS # BLD AUTO: 0 K/UL — SIGNIFICANT CHANGE UP (ref 0–0.2)
BASOPHILS NFR BLD AUTO: 0 % — SIGNIFICANT CHANGE UP (ref 0–2)
BILIRUB SERPL-MCNC: 0.5 MG/DL — SIGNIFICANT CHANGE UP (ref 0.2–1.2)
BUN SERPL-MCNC: 17 MG/DL — SIGNIFICANT CHANGE UP (ref 7–23)
CALCIUM SERPL-MCNC: 8.2 MG/DL — LOW (ref 8.4–10.5)
CHLORIDE SERPL-SCNC: 104 MMOL/L — SIGNIFICANT CHANGE UP (ref 96–108)
CK MB BLD-MCNC: 4.5 % — HIGH (ref 0–3.5)
CK MB BLD-MCNC: 6.1 % — HIGH (ref 0–3.5)
CK MB CFR SERPL CALC: 10.9 NG/ML — HIGH (ref 0–6.7)
CK MB CFR SERPL CALC: 11.9 NG/ML — HIGH (ref 0–6.7)
CK SERPL-CCNC: 180 U/L — SIGNIFICANT CHANGE UP (ref 30–200)
CK SERPL-CCNC: 264 U/L — HIGH (ref 30–200)
CO2 SERPL-SCNC: 25 MMOL/L — SIGNIFICANT CHANGE UP (ref 22–31)
CREAT SERPL-MCNC: 0.81 MG/DL — SIGNIFICANT CHANGE UP (ref 0.5–1.3)
EOSINOPHIL # BLD AUTO: 0.55 K/UL — HIGH (ref 0–0.5)
EOSINOPHIL NFR BLD AUTO: 2 % — SIGNIFICANT CHANGE UP (ref 0–6)
FIBRINOGEN PPP-MCNC: 356 MG/DL — SIGNIFICANT CHANGE UP (ref 350–510)
GAS PNL BLDA: SIGNIFICANT CHANGE UP
GLUCOSE SERPL-MCNC: 127 MG/DL — HIGH (ref 70–99)
HCT VFR BLD CALC: 26.6 % — LOW (ref 39–50)
HGB BLD-MCNC: 9.1 G/DL — LOW (ref 13–17)
INR BLD: 1.27 RATIO — HIGH (ref 0.88–1.16)
LYMPHOCYTES # BLD AUTO: 2.2 K/UL — SIGNIFICANT CHANGE UP (ref 1–3.3)
LYMPHOCYTES # BLD AUTO: 8 % — LOW (ref 13–44)
MAGNESIUM SERPL-MCNC: 2.8 MG/DL — HIGH (ref 1.6–2.6)
MCHC RBC-ENTMCNC: 33.7 PG — SIGNIFICANT CHANGE UP (ref 27–34)
MCHC RBC-ENTMCNC: 34.2 GM/DL — SIGNIFICANT CHANGE UP (ref 32–36)
MCV RBC AUTO: 98.5 FL — SIGNIFICANT CHANGE UP (ref 80–100)
MONOCYTES # BLD AUTO: 1.37 K/UL — HIGH (ref 0–0.9)
MONOCYTES NFR BLD AUTO: 5 % — SIGNIFICANT CHANGE UP (ref 2–14)
NEUTROPHILS # BLD AUTO: 22.81 K/UL — HIGH (ref 1.8–7.4)
NEUTROPHILS NFR BLD AUTO: 81 % — HIGH (ref 43–77)
PHOSPHATE SERPL-MCNC: 2 MG/DL — LOW (ref 2.5–4.5)
PLATELET # BLD AUTO: 212 K/UL — SIGNIFICANT CHANGE UP (ref 150–400)
POTASSIUM SERPL-MCNC: 4 MMOL/L — SIGNIFICANT CHANGE UP (ref 3.5–5.3)
POTASSIUM SERPL-SCNC: 4 MMOL/L — SIGNIFICANT CHANGE UP (ref 3.5–5.3)
PROT SERPL-MCNC: 5.5 G/DL — LOW (ref 6–8.3)
PROTHROM AB SERPL-ACNC: 14.7 SEC — HIGH (ref 10–12.9)
RBC # BLD: 2.7 M/UL — LOW (ref 4.2–5.8)
RBC # FLD: 12.5 % — SIGNIFICANT CHANGE UP (ref 10.3–14.5)
SODIUM SERPL-SCNC: 141 MMOL/L — SIGNIFICANT CHANGE UP (ref 135–145)
TROPONIN T, HIGH SENSITIVITY RESULT: 225 NG/L — HIGH (ref 0–51)
TROPONIN T, HIGH SENSITIVITY RESULT: 276 NG/L — HIGH (ref 0–51)
WBC # BLD: 27.48 K/UL — HIGH (ref 3.8–10.5)
WBC # FLD AUTO: 27.48 K/UL — HIGH (ref 3.8–10.5)

## 2020-01-02 PROCEDURE — 33533 CABG ARTERIAL SINGLE: CPT | Mod: AS

## 2020-01-02 PROCEDURE — 71045 X-RAY EXAM CHEST 1 VIEW: CPT | Mod: 26

## 2020-01-02 PROCEDURE — 93010 ELECTROCARDIOGRAM REPORT: CPT | Mod: 76

## 2020-01-02 PROCEDURE — 99291 CRITICAL CARE FIRST HOUR: CPT

## 2020-01-02 PROCEDURE — 33518 CABG ARTERY-VEIN TWO: CPT

## 2020-01-02 PROCEDURE — 33518 CABG ARTERY-VEIN TWO: CPT | Mod: AS

## 2020-01-02 PROCEDURE — 33533 CABG ARTERIAL SINGLE: CPT

## 2020-01-02 RX ORDER — ALBUMIN HUMAN 25 %
250 VIAL (ML) INTRAVENOUS ONCE
Refills: 0 | Status: COMPLETED | OUTPATIENT
Start: 2020-01-02 | End: 2020-01-02

## 2020-01-02 RX ORDER — POTASSIUM CHLORIDE 20 MEQ
10 PACKET (EA) ORAL
Refills: 0 | Status: COMPLETED | OUTPATIENT
Start: 2020-01-02 | End: 2020-01-02

## 2020-01-02 RX ORDER — HYDROMORPHONE HYDROCHLORIDE 2 MG/ML
0.5 INJECTION INTRAMUSCULAR; INTRAVENOUS; SUBCUTANEOUS ONCE
Refills: 0 | Status: DISCONTINUED | OUTPATIENT
Start: 2020-01-02 | End: 2020-01-02

## 2020-01-02 RX ORDER — CHLORHEXIDINE GLUCONATE 213 G/1000ML
15 SOLUTION TOPICAL EVERY 12 HOURS
Refills: 0 | Status: DISCONTINUED | OUTPATIENT
Start: 2020-01-02 | End: 2020-01-03

## 2020-01-02 RX ORDER — POTASSIUM CHLORIDE 20 MEQ
10 PACKET (EA) ORAL
Refills: 0 | Status: DISCONTINUED | OUTPATIENT
Start: 2020-01-02 | End: 2020-01-03

## 2020-01-02 RX ORDER — SODIUM CHLORIDE 9 MG/ML
1000 INJECTION INTRAMUSCULAR; INTRAVENOUS; SUBCUTANEOUS
Refills: 0 | Status: DISCONTINUED | OUTPATIENT
Start: 2020-01-02 | End: 2020-01-05

## 2020-01-02 RX ORDER — DEXMEDETOMIDINE HYDROCHLORIDE IN 0.9% SODIUM CHLORIDE 4 UG/ML
0.2 INJECTION INTRAVENOUS
Qty: 200 | Refills: 0 | Status: DISCONTINUED | OUTPATIENT
Start: 2020-01-02 | End: 2020-01-03

## 2020-01-02 RX ORDER — INSULIN HUMAN 100 [IU]/ML
3 INJECTION, SOLUTION SUBCUTANEOUS
Qty: 100 | Refills: 0 | Status: DISCONTINUED | OUTPATIENT
Start: 2020-01-02 | End: 2020-01-03

## 2020-01-02 RX ORDER — MEPERIDINE HYDROCHLORIDE 50 MG/ML
25 INJECTION INTRAMUSCULAR; INTRAVENOUS; SUBCUTANEOUS ONCE
Refills: 0 | Status: DISCONTINUED | OUTPATIENT
Start: 2020-01-02 | End: 2020-01-03

## 2020-01-02 RX ORDER — ASPIRIN/CALCIUM CARB/MAGNESIUM 324 MG
81 TABLET ORAL DAILY
Refills: 0 | Status: DISCONTINUED | OUTPATIENT
Start: 2020-01-02 | End: 2020-01-06

## 2020-01-02 RX ORDER — ASPIRIN/CALCIUM CARB/MAGNESIUM 324 MG
300 TABLET ORAL ONCE
Refills: 0 | Status: COMPLETED | OUTPATIENT
Start: 2020-01-02 | End: 2020-01-02

## 2020-01-02 RX ORDER — FAMOTIDINE 10 MG/ML
20 INJECTION INTRAVENOUS DAILY
Refills: 0 | Status: DISCONTINUED | OUTPATIENT
Start: 2020-01-02 | End: 2020-01-03

## 2020-01-02 RX ORDER — DEXTROSE 50 % IN WATER 50 %
50 SYRINGE (ML) INTRAVENOUS
Refills: 0 | Status: DISCONTINUED | OUTPATIENT
Start: 2020-01-02 | End: 2020-01-06

## 2020-01-02 RX ORDER — NOREPINEPHRINE BITARTRATE/D5W 8 MG/250ML
0.06 PLASTIC BAG, INJECTION (ML) INTRAVENOUS
Qty: 8 | Refills: 0 | Status: DISCONTINUED | OUTPATIENT
Start: 2020-01-02 | End: 2020-01-03

## 2020-01-02 RX ORDER — DEXTROSE 50 % IN WATER 50 %
25 SYRINGE (ML) INTRAVENOUS
Refills: 0 | Status: DISCONTINUED | OUTPATIENT
Start: 2020-01-02 | End: 2020-01-06

## 2020-01-02 RX ORDER — ONDANSETRON 8 MG/1
4 TABLET, FILM COATED ORAL ONCE
Refills: 0 | Status: DISCONTINUED | OUTPATIENT
Start: 2020-01-02 | End: 2020-01-02

## 2020-01-02 RX ORDER — METOCLOPRAMIDE HCL 10 MG
10 TABLET ORAL EVERY 8 HOURS
Refills: 0 | Status: COMPLETED | OUTPATIENT
Start: 2020-01-02 | End: 2020-01-04

## 2020-01-02 RX ORDER — CHLORHEXIDINE GLUCONATE 213 G/1000ML
1 SOLUTION TOPICAL
Refills: 0 | Status: DISCONTINUED | OUTPATIENT
Start: 2020-01-02 | End: 2020-01-06

## 2020-01-02 RX ORDER — OXYCODONE AND ACETAMINOPHEN 5; 325 MG/1; MG/1
1 TABLET ORAL EVERY 4 HOURS
Refills: 0 | Status: DISCONTINUED | OUTPATIENT
Start: 2020-01-02 | End: 2020-01-06

## 2020-01-02 RX ORDER — DOBUTAMINE HCL 250MG/20ML
2.5 VIAL (ML) INTRAVENOUS
Qty: 500 | Refills: 0 | Status: DISCONTINUED | OUTPATIENT
Start: 2020-01-02 | End: 2020-01-03

## 2020-01-02 RX ORDER — POLYETHYLENE GLYCOL 3350 17 G/17G
17 POWDER, FOR SOLUTION ORAL DAILY
Refills: 0 | Status: DISCONTINUED | OUTPATIENT
Start: 2020-01-02 | End: 2020-01-06

## 2020-01-02 RX ORDER — CEFUROXIME AXETIL 250 MG
1500 TABLET ORAL EVERY 8 HOURS
Refills: 0 | Status: COMPLETED | OUTPATIENT
Start: 2020-01-02 | End: 2020-01-04

## 2020-01-02 RX ORDER — OXYCODONE AND ACETAMINOPHEN 5; 325 MG/1; MG/1
2 TABLET ORAL EVERY 6 HOURS
Refills: 0 | Status: DISCONTINUED | OUTPATIENT
Start: 2020-01-02 | End: 2020-01-06

## 2020-01-02 RX ORDER — ASPIRIN/CALCIUM CARB/MAGNESIUM 324 MG
300 TABLET ORAL ONCE
Refills: 0 | Status: COMPLETED | OUTPATIENT
Start: 2020-01-02

## 2020-01-02 RX ADMIN — CHLORHEXIDINE GLUCONATE 15 MILLILITER(S): 213 SOLUTION TOPICAL at 17:13

## 2020-01-02 RX ADMIN — Medication 10 MILLIGRAM(S): at 23:19

## 2020-01-02 RX ADMIN — Medication 500 MILLILITER(S): at 14:09

## 2020-01-02 RX ADMIN — Medication 100 MILLIGRAM(S): at 15:31

## 2020-01-02 RX ADMIN — CHLORHEXIDINE GLUCONATE 15 MILLILITER(S): 213 SOLUTION TOPICAL at 05:23

## 2020-01-02 RX ADMIN — HYDROMORPHONE HYDROCHLORIDE 0.5 MILLIGRAM(S): 2 INJECTION INTRAMUSCULAR; INTRAVENOUS; SUBCUTANEOUS at 17:52

## 2020-01-02 RX ADMIN — Medication 125 MILLILITER(S): at 23:00

## 2020-01-02 RX ADMIN — Medication 300 MILLIGRAM(S): at 19:39

## 2020-01-02 RX ADMIN — HYDROMORPHONE HYDROCHLORIDE 0.5 MILLIGRAM(S): 2 INJECTION INTRAMUSCULAR; INTRAVENOUS; SUBCUTANEOUS at 17:35

## 2020-01-02 RX ADMIN — Medication 125 MILLILITER(S): at 20:52

## 2020-01-02 RX ADMIN — DEXMEDETOMIDINE HYDROCHLORIDE IN 0.9% SODIUM CHLORIDE 2.85 MICROGRAM(S)/KG/HR: 4 INJECTION INTRAVENOUS at 22:04

## 2020-01-02 RX ADMIN — SODIUM CHLORIDE 10 MILLILITER(S): 9 INJECTION INTRAMUSCULAR; INTRAVENOUS; SUBCUTANEOUS at 13:22

## 2020-01-02 RX ADMIN — DEXMEDETOMIDINE HYDROCHLORIDE IN 0.9% SODIUM CHLORIDE 2.85 MICROGRAM(S)/KG/HR: 4 INJECTION INTRAVENOUS at 15:30

## 2020-01-02 RX ADMIN — HYDROMORPHONE HYDROCHLORIDE 0.5 MILLIGRAM(S): 2 INJECTION INTRAMUSCULAR; INTRAVENOUS; SUBCUTANEOUS at 19:50

## 2020-01-02 RX ADMIN — INSULIN HUMAN 3 UNIT(S)/HR: 100 INJECTION, SOLUTION SUBCUTANEOUS at 22:04

## 2020-01-02 RX ADMIN — Medication 125 MILLILITER(S): at 19:56

## 2020-01-02 RX ADMIN — Medication 100 MILLIEQUIVALENT(S): at 13:50

## 2020-01-02 RX ADMIN — Medication 6.41 MICROGRAM(S)/KG/MIN: at 13:32

## 2020-01-02 RX ADMIN — Medication 500 MILLILITER(S): at 16:07

## 2020-01-02 RX ADMIN — Medication 125 MILLILITER(S): at 22:30

## 2020-01-02 RX ADMIN — HYDROMORPHONE HYDROCHLORIDE 0.5 MILLIGRAM(S): 2 INJECTION INTRAMUSCULAR; INTRAVENOUS; SUBCUTANEOUS at 19:35

## 2020-01-02 RX ADMIN — Medication 100 MILLIEQUIVALENT(S): at 14:30

## 2020-01-02 RX ADMIN — Medication 6.41 MICROGRAM(S)/KG/MIN: at 22:05

## 2020-01-02 RX ADMIN — Medication 4.28 MICROGRAM(S)/KG/MIN: at 22:04

## 2020-01-02 RX ADMIN — FAMOTIDINE 20 MILLIGRAM(S): 10 INJECTION INTRAVENOUS at 13:49

## 2020-01-02 RX ADMIN — Medication 100 MILLIEQUIVALENT(S): at 14:09

## 2020-01-02 RX ADMIN — Medication 500 MILLILITER(S): at 16:20

## 2020-01-02 RX ADMIN — SODIUM CHLORIDE 3 MILLILITER(S): 9 INJECTION INTRAMUSCULAR; INTRAVENOUS; SUBCUTANEOUS at 05:23

## 2020-01-02 RX ADMIN — Medication 62.5 MILLIMOLE(S): at 16:59

## 2020-01-02 RX ADMIN — Medication 500 MILLILITER(S): at 14:08

## 2020-01-02 NOTE — BRIEF OPERATIVE NOTE - COMMENTS
***estimated blood loss could not be accurately assessed given the use of cardiopulmonary bypass machine and the cell-saver device

## 2020-01-02 NOTE — BRIEF OPERATIVE NOTE - NSICDXBRIEFPROCEDURE_GEN_ALL_CORE_FT
PROCEDURES:  Creation of coronary artery bypass using one arterial and two venous grafts 02-Jan-2020 14:08:37  Ramy Coleman

## 2020-01-02 NOTE — BRIEF OPERATIVE NOTE - NSICDXBRIEFPOSTOP_GEN_ALL_CORE_FT
POST-OP DIAGNOSIS:  Coronary artery disease with unstable angina pectoris 02-Jan-2020 14:09:17  Ramy Coleman

## 2020-01-02 NOTE — PROGRESS NOTE ADULT - SUBJECTIVE AND OBJECTIVE BOX
CRITICAL CARE ATTENDING - CTICU    MEDICATIONS  (STANDING):  aspirin enteric coated 81 milliGRAM(s) Oral daily  aspirin Suppository 300 milliGRAM(s) Rectal once  cefuroxime  IVPB 1500 milliGRAM(s) IV Intermittent every 8 hours  chlorhexidine 0.12% Liquid 15 milliLiter(s) Oral Mucosa every 12 hours  chlorhexidine 2% Cloths 1 Application(s) Topical <User Schedule>  dextrose 50% Injectable 50 milliLiter(s) IV Push every 15 minutes  dextrose 50% Injectable 25 milliLiter(s) IV Push every 15 minutes  famotidine Injectable 20 milliGRAM(s) IV Push daily  influenza   Vaccine 0.5 milliLiter(s) IntraMuscular once  insulin regular Infusion 3 Unit(s)/Hr (3 mL/Hr) IV Continuous <Continuous>  meperidine     Injectable 25 milliGRAM(s) IV Push once  norepinephrine Infusion 0.06 MICROgram(s)/kG/Min (6.412 mL/Hr) IV Continuous <Continuous>  polyethylene glycol 3350 17 Gram(s) Oral daily  potassium chloride  10 mEq/50 mL IVPB 10 milliEquivalent(s) IV Intermittent every 1 hour  potassium chloride  10 mEq/50 mL IVPB 10 milliEquivalent(s) IV Intermittent every 1 hour  potassium chloride  10 mEq/50 mL IVPB 10 milliEquivalent(s) IV Intermittent every 1 hour  potassium chloride  10 mEq/50 mL IVPB 10 milliEquivalent(s) IV Intermittent every 1 hour  sodium chloride 0.9%. 1000 milliLiter(s) (10 mL/Hr) IV Continuous <Continuous>                                    9.1    27.48 )-----------( 212      ( 02 Jan 2020 13:32 )             26.6       01-02    141  |  104  |  17  ----------------------------<  127<H>  4.0   |  25  |  0.81    Ca    8.2<L>      02 Jan 2020 13:40  Phos  2.0     01-02  Mg     2.8     01-02    TPro  5.5<L>  /  Alb  3.9  /  TBili  0.5  /  DBili  x   /  AST  26  /  ALT  15  /  AlkPhos  50  01-02      PT/INR - ( 02 Jan 2020 13:32 )   PT: 14.7 sec;   INR: 1.27 ratio         PTT - ( 02 Jan 2020 13:32 )  PTT:33.5 sec    Mode: AC/ CMV (Assist Control/ Continuous Mandatory Ventilation)  RR (machine): 14  TV (machine): 550  FiO2: 40  PEEP: 5  ITime: 1  MAP: 8  PIP: 18      Daily Height in cm: 180.34 (01 Jan 2020 19:05)    Daily       01-01 @ 07:01 - 01-02 @ 07:00  --------------------------------------------------------  IN: 770 mL / OUT: 300 mL / NET: 470 mL    01-02 @ 07:01 - 01-02 @ 14:23  --------------------------------------------------------  IN: 576.4 mL / OUT: 280 mL / NET: 296.4 mL        Critically Ill patient  : [ ] preoperative ,   [ x] post operative    Requires :  [x ] Arterial Line   [x ] Central Line  [ ] PA catheter  [ ] IABP  [ ] ECMO  [ ] LVAD  [ x] Ventilator  [x ] pacemaker [ ] Impella.                      [ x] ABG's     [x ] Pulse Oxymetry Monitoring  Bedside evaluation , monitoring , treatment of hemodynamics , fluids , IVP/ IVCD meds.        Diagnosis:     Op Day - CABG X 3 L    NSTEMI    Hypotension    Hypovolemia    Hemodynamic lability,  instability. Requires IVCD [ x] vasopressors [ ] inotropes  [ ] vasodilator  [ x]IVSS fluid  to maintain MAP, perfusion, C.I.     Ventilator Management:  [x ]AC-rest    [x ]CPAP-PS Wean this PM     [ ]Trach Collar     [ ]Extubate    [ ] T-Piece  [ ]peep>5     Requires chest PT, pulmonary toilet, ambu bagging, suctioning to maintain SaO2,  patent airway and treat atelectasis.     ECG     Chest Tube Drainage     h/o ETOH / Smoking     DT's recently- would place on Precedex post op     IVCD Insulin     Temporary pacemaker (TPM) interrogation and setting.                             Discussed with CT surgeon, Physician's Assistant - Nurse Practitioner- Critical care medicine team.   Dicussed at  AM / PM rounds.   Chart, labs , films reviewed.    Total Time: 30 min

## 2020-01-02 NOTE — BRIEF OPERATIVE NOTE - NSICDXBRIEFPREOP_GEN_ALL_CORE_FT
PRE-OP DIAGNOSIS:  Coronary artery disease with unstable angina pectoris 02-Jan-2020 14:08:59  Ramy Coleman

## 2020-01-03 LAB
ALBUMIN SERPL ELPH-MCNC: 4.9 G/DL — SIGNIFICANT CHANGE UP (ref 3.3–5)
ALP SERPL-CCNC: 27 U/L — LOW (ref 40–120)
ALT FLD-CCNC: 12 U/L — SIGNIFICANT CHANGE UP (ref 10–45)
ANION GAP SERPL CALC-SCNC: 11 MMOL/L — SIGNIFICANT CHANGE UP (ref 5–17)
APTT BLD: 37.9 SEC — HIGH (ref 27.5–36.3)
AST SERPL-CCNC: 25 U/L — SIGNIFICANT CHANGE UP (ref 10–40)
BILIRUB SERPL-MCNC: 0.5 MG/DL — SIGNIFICANT CHANGE UP (ref 0.2–1.2)
BUN SERPL-MCNC: 14 MG/DL — SIGNIFICANT CHANGE UP (ref 7–23)
CALCIUM SERPL-MCNC: 8.8 MG/DL — SIGNIFICANT CHANGE UP (ref 8.4–10.5)
CHLORIDE SERPL-SCNC: 103 MMOL/L — SIGNIFICANT CHANGE UP (ref 96–108)
CK MB BLD-MCNC: 2.9 % — SIGNIFICANT CHANGE UP (ref 0–3.5)
CK MB CFR SERPL CALC: 13.2 NG/ML — HIGH (ref 0–6.7)
CK SERPL-CCNC: 448 U/L — HIGH (ref 30–200)
CO2 SERPL-SCNC: 24 MMOL/L — SIGNIFICANT CHANGE UP (ref 22–31)
CREAT SERPL-MCNC: 0.86 MG/DL — SIGNIFICANT CHANGE UP (ref 0.5–1.3)
GAS PNL BLDA: SIGNIFICANT CHANGE UP
GLUCOSE SERPL-MCNC: 127 MG/DL — HIGH (ref 70–99)
HCT VFR BLD CALC: 26.6 % — LOW (ref 39–50)
HGB BLD-MCNC: 9.4 G/DL — LOW (ref 13–17)
INR BLD: 1.19 RATIO — HIGH (ref 0.88–1.16)
MAGNESIUM SERPL-MCNC: 2.4 MG/DL — SIGNIFICANT CHANGE UP (ref 1.6–2.6)
MCHC RBC-ENTMCNC: 33.5 PG — SIGNIFICANT CHANGE UP (ref 27–34)
MCHC RBC-ENTMCNC: 35.3 GM/DL — SIGNIFICANT CHANGE UP (ref 32–36)
MCV RBC AUTO: 94.7 FL — SIGNIFICANT CHANGE UP (ref 80–100)
NRBC # BLD: 0 /100 WBCS — SIGNIFICANT CHANGE UP (ref 0–0)
PHOSPHATE SERPL-MCNC: 2.7 MG/DL — SIGNIFICANT CHANGE UP (ref 2.5–4.5)
PLATELET # BLD AUTO: 143 K/UL — LOW (ref 150–400)
POTASSIUM SERPL-MCNC: 4.2 MMOL/L — SIGNIFICANT CHANGE UP (ref 3.5–5.3)
POTASSIUM SERPL-SCNC: 4.2 MMOL/L — SIGNIFICANT CHANGE UP (ref 3.5–5.3)
PROT SERPL-MCNC: 6.1 G/DL — SIGNIFICANT CHANGE UP (ref 6–8.3)
PROTHROM AB SERPL-ACNC: 13.7 SEC — HIGH (ref 10–12.9)
RBC # BLD: 2.81 M/UL — LOW (ref 4.2–5.8)
RBC # FLD: 14.3 % — SIGNIFICANT CHANGE UP (ref 10.3–14.5)
SODIUM SERPL-SCNC: 138 MMOL/L — SIGNIFICANT CHANGE UP (ref 135–145)
TSH RECEP AB FLD-ACNC: <1.1 IU/L — SIGNIFICANT CHANGE UP (ref 0–1.75)
WBC # BLD: 18.3 K/UL — HIGH (ref 3.8–10.5)
WBC # FLD AUTO: 18.3 K/UL — HIGH (ref 3.8–10.5)

## 2020-01-03 PROCEDURE — 71045 X-RAY EXAM CHEST 1 VIEW: CPT | Mod: 26

## 2020-01-03 PROCEDURE — 93010 ELECTROCARDIOGRAM REPORT: CPT

## 2020-01-03 PROCEDURE — 99233 SBSQ HOSP IP/OBS HIGH 50: CPT

## 2020-01-03 RX ORDER — ONDANSETRON 8 MG/1
4 TABLET, FILM COATED ORAL ONCE
Refills: 0 | Status: COMPLETED | OUTPATIENT
Start: 2020-01-03 | End: 2020-01-03

## 2020-01-03 RX ORDER — ALBUMIN HUMAN 25 %
250 VIAL (ML) INTRAVENOUS ONCE
Refills: 0 | Status: COMPLETED | OUTPATIENT
Start: 2020-01-03 | End: 2020-01-02

## 2020-01-03 RX ORDER — PANTOPRAZOLE SODIUM 20 MG/1
40 TABLET, DELAYED RELEASE ORAL
Refills: 0 | Status: DISCONTINUED | OUTPATIENT
Start: 2020-01-03 | End: 2020-01-06

## 2020-01-03 RX ORDER — HYDRALAZINE HCL 50 MG
10 TABLET ORAL ONCE
Refills: 0 | Status: COMPLETED | OUTPATIENT
Start: 2020-01-03 | End: 2020-01-03

## 2020-01-03 RX ORDER — HYDROMORPHONE HYDROCHLORIDE 2 MG/ML
0.5 INJECTION INTRAMUSCULAR; INTRAVENOUS; SUBCUTANEOUS ONCE
Refills: 0 | Status: DISCONTINUED | OUTPATIENT
Start: 2020-01-03 | End: 2020-01-03

## 2020-01-03 RX ORDER — ATORVASTATIN CALCIUM 80 MG/1
80 TABLET, FILM COATED ORAL AT BEDTIME
Refills: 0 | Status: DISCONTINUED | OUTPATIENT
Start: 2020-01-03 | End: 2020-01-06

## 2020-01-03 RX ORDER — METOPROLOL TARTRATE 50 MG
25 TABLET ORAL EVERY 8 HOURS
Refills: 0 | Status: DISCONTINUED | OUTPATIENT
Start: 2020-01-03 | End: 2020-01-03

## 2020-01-03 RX ORDER — INSULIN LISPRO 100/ML
VIAL (ML) SUBCUTANEOUS
Refills: 0 | Status: DISCONTINUED | OUTPATIENT
Start: 2020-01-03 | End: 2020-01-06

## 2020-01-03 RX ORDER — METOPROLOL TARTRATE 50 MG
50 TABLET ORAL
Refills: 0 | Status: DISCONTINUED | OUTPATIENT
Start: 2020-01-03 | End: 2020-01-06

## 2020-01-03 RX ORDER — METOPROLOL TARTRATE 50 MG
25 TABLET ORAL ONCE
Refills: 0 | Status: COMPLETED | OUTPATIENT
Start: 2020-01-03 | End: 2020-01-03

## 2020-01-03 RX ORDER — ALBUMIN HUMAN 25 %
250 VIAL (ML) INTRAVENOUS ONCE
Refills: 0 | Status: COMPLETED | OUTPATIENT
Start: 2020-01-03 | End: 2020-01-03

## 2020-01-03 RX ORDER — ENOXAPARIN SODIUM 100 MG/ML
40 INJECTION SUBCUTANEOUS DAILY
Refills: 0 | Status: DISCONTINUED | OUTPATIENT
Start: 2020-01-03 | End: 2020-01-06

## 2020-01-03 RX ORDER — AMLODIPINE BESYLATE 2.5 MG/1
10 TABLET ORAL DAILY
Refills: 0 | Status: DISCONTINUED | OUTPATIENT
Start: 2020-01-03 | End: 2020-01-06

## 2020-01-03 RX ADMIN — Medication 100 MILLIGRAM(S): at 07:35

## 2020-01-03 RX ADMIN — Medication 10 MILLIGRAM(S): at 14:00

## 2020-01-03 RX ADMIN — AMLODIPINE BESYLATE 10 MILLIGRAM(S): 2.5 TABLET ORAL at 08:38

## 2020-01-03 RX ADMIN — Medication 100 MILLIGRAM(S): at 01:39

## 2020-01-03 RX ADMIN — INSULIN HUMAN 3 UNIT(S)/HR: 100 INJECTION, SOLUTION SUBCUTANEOUS at 11:28

## 2020-01-03 RX ADMIN — Medication 25 MILLIGRAM(S): at 21:17

## 2020-01-03 RX ADMIN — POLYETHYLENE GLYCOL 3350 17 GRAM(S): 17 POWDER, FOR SOLUTION ORAL at 11:28

## 2020-01-03 RX ADMIN — Medication 25 MILLIGRAM(S): at 09:17

## 2020-01-03 RX ADMIN — Medication 10 MILLIGRAM(S): at 21:17

## 2020-01-03 RX ADMIN — Medication 125 MILLILITER(S): at 06:55

## 2020-01-03 RX ADMIN — Medication 10 MILLIGRAM(S): at 10:15

## 2020-01-03 RX ADMIN — OXYCODONE AND ACETAMINOPHEN 2 TABLET(S): 5; 325 TABLET ORAL at 22:30

## 2020-01-03 RX ADMIN — Medication 100 MILLIGRAM(S): at 16:35

## 2020-01-03 RX ADMIN — HYDROMORPHONE HYDROCHLORIDE 0.5 MILLIGRAM(S): 2 INJECTION INTRAMUSCULAR; INTRAVENOUS; SUBCUTANEOUS at 06:00

## 2020-01-03 RX ADMIN — HYDROMORPHONE HYDROCHLORIDE 0.5 MILLIGRAM(S): 2 INJECTION INTRAMUSCULAR; INTRAVENOUS; SUBCUTANEOUS at 08:30

## 2020-01-03 RX ADMIN — Medication 10 MILLIGRAM(S): at 23:52

## 2020-01-03 RX ADMIN — Medication 125 MILLILITER(S): at 16:35

## 2020-01-03 RX ADMIN — ATORVASTATIN CALCIUM 80 MILLIGRAM(S): 80 TABLET, FILM COATED ORAL at 21:17

## 2020-01-03 RX ADMIN — Medication 2: at 17:39

## 2020-01-03 RX ADMIN — HYDROMORPHONE HYDROCHLORIDE 0.5 MILLIGRAM(S): 2 INJECTION INTRAMUSCULAR; INTRAVENOUS; SUBCUTANEOUS at 05:45

## 2020-01-03 RX ADMIN — CHLORHEXIDINE GLUCONATE 15 MILLILITER(S): 213 SOLUTION TOPICAL at 05:49

## 2020-01-03 RX ADMIN — Medication 5: at 12:14

## 2020-01-03 RX ADMIN — Medication 81 MILLIGRAM(S): at 11:28

## 2020-01-03 RX ADMIN — OXYCODONE AND ACETAMINOPHEN 1 TABLET(S): 5; 325 TABLET ORAL at 16:20

## 2020-01-03 RX ADMIN — HYDROMORPHONE HYDROCHLORIDE 0.5 MILLIGRAM(S): 2 INJECTION INTRAMUSCULAR; INTRAVENOUS; SUBCUTANEOUS at 08:08

## 2020-01-03 RX ADMIN — OXYCODONE AND ACETAMINOPHEN 1 TABLET(S): 5; 325 TABLET ORAL at 15:49

## 2020-01-03 RX ADMIN — Medication 10 MILLIGRAM(S): at 05:43

## 2020-01-03 RX ADMIN — Medication 25 MILLIGRAM(S): at 21:50

## 2020-01-03 RX ADMIN — SODIUM CHLORIDE 10 MILLILITER(S): 9 INJECTION INTRAMUSCULAR; INTRAVENOUS; SUBCUTANEOUS at 11:29

## 2020-01-03 RX ADMIN — Medication 25 MILLIGRAM(S): at 14:10

## 2020-01-03 RX ADMIN — OXYCODONE AND ACETAMINOPHEN 2 TABLET(S): 5; 325 TABLET ORAL at 21:20

## 2020-01-03 RX ADMIN — Medication 5: at 21:16

## 2020-01-03 RX ADMIN — ENOXAPARIN SODIUM 40 MILLIGRAM(S): 100 INJECTION SUBCUTANEOUS at 08:07

## 2020-01-03 RX ADMIN — CHLORHEXIDINE GLUCONATE 1 APPLICATION(S): 213 SOLUTION TOPICAL at 05:49

## 2020-01-03 NOTE — PHYSICAL THERAPY INITIAL EVALUATION ADULT - ADDITIONAL COMMENTS
pt lives in a pvt house with his daughter, with no steps. PTA pt was independent w/ all functional mobility, and did not utilize an AD.

## 2020-01-03 NOTE — PROGRESS NOTE ADULT - SUBJECTIVE AND OBJECTIVE BOX
CRITICAL CARE ATTENDING - CTICU    MEDICATIONS  (STANDING):  aspirin enteric coated 81 milliGRAM(s) Oral daily  cefuroxime  IVPB 1500 milliGRAM(s) IV Intermittent every 8 hours  chlorhexidine 0.12% Liquid 15 milliLiter(s) Oral Mucosa every 12 hours  chlorhexidine 2% Cloths 1 Application(s) Topical <User Schedule>  dexMEDEtomidine Infusion 0.2 MICROgram(s)/kG/Hr (2.85 mL/Hr) IV Continuous <Continuous>  dextrose 50% Injectable 50 milliLiter(s) IV Push every 15 minutes  dextrose 50% Injectable 25 milliLiter(s) IV Push every 15 minutes  DOBUTamine Infusion 2.5 MICROgram(s)/kG/Min (4.275 mL/Hr) IV Continuous <Continuous>  famotidine Injectable 20 milliGRAM(s) IV Push daily  influenza   Vaccine 0.5 milliLiter(s) IntraMuscular once  insulin regular Infusion 3 Unit(s)/Hr (3 mL/Hr) IV Continuous <Continuous>  meperidine     Injectable 25 milliGRAM(s) IV Push once  metoclopramide Injectable 10 milliGRAM(s) IV Push every 8 hours  norepinephrine Infusion 0.06 MICROgram(s)/kG/Min (6.412 mL/Hr) IV Continuous <Continuous>  polyethylene glycol 3350 17 Gram(s) Oral daily  potassium chloride  10 mEq/50 mL IVPB 10 milliEquivalent(s) IV Intermittent every 1 hour  potassium chloride  10 mEq/50 mL IVPB 10 milliEquivalent(s) IV Intermittent every 1 hour  potassium chloride  10 mEq/50 mL IVPB 10 milliEquivalent(s) IV Intermittent every 1 hour  sodium chloride 0.9%. 1000 milliLiter(s) (10 mL/Hr) IV Continuous <Continuous>                                    9.4    18.30 )-----------( 143      ( 2020 00:40 )             26.6       01-03    138  |  103  |  14  ----------------------------<  127<H>  4.2   |  24  |  0.86    Ca    8.8      2020 00:40  Phos  2.7     01-03  Mg     2.4     01-03    TPro  6.1  /  Alb  4.9  /  TBili  0.5  /  DBili  x   /  AST  25  /  ALT  12  /  AlkPhos  27<L>  01-03      PT/INR - ( 2020 00:40 )   PT: 13.7 sec;   INR: 1.19 ratio         PTT - ( 2020 00:40 )  PTT:37.9 sec    Mode: CPAP with PS  FiO2: 40  PEEP: 5  PS: 5  MAP: 8  PIP: 11      Daily     Daily Weight in k.2 (2020 00:00)       @ 07:  -   @ 07:00  --------------------------------------------------------  IN: 770 mL / OUT: 300 mL / NET: 470 mL     @ 07:  -   @ 06:41  --------------------------------------------------------  IN: 3726.8 mL / OUT: 2400 mL / NET: 1326.8 mL        Critically Ill patient  : [ ] preoperative ,   [ x] post operative    Requires :  [ x] Arterial Line   [ x] Central Line  [ ] PA catheter  [ ] IABP  [ ] ECMO  [ ] LVAD  [ ] Ventilator  [x ] pacemaker [ ] Impella.                      [ x] ABG's     [ x] Pulse Oxymetry Monitoring  Bedside evaluation , monitoring , treatment of hemodynamics , fluids , IVP/ IVCD meds.        Diagnosis:     POD 1 - CABG X 3 L    NSTEMI    Hypotension    Hypovolemia    Hemodynamic lability,  instability. Requires IVCD [ x] vasopressors [ x] inotropes  [ ] vasodilator  [ x]IVSS fluid  to maintain MAP, perfusion, C.I.     Ventilator Management:  [ ]AC-rest    [x ]CPAP-PS Wean this PM     [ ]Trach Collar     [ ]Extubate    [ ] T-Piece  [ ]peep>5     Requires chest PT, pulmonary toilet, suctioning to maintain SaO2,  patent airway and treat atelectasis.     ECG     Chest Tube Drainage     h/o ETOH / Smoking     DT's recently- would place on Precedex post op     IVCD Insulin     Temporary pacemaker (TPM) interrogation and setting.     Thrombocytopenia         Discussed with CT surgeon, Physician's Assistant - Nurse Practitioner- Critical care medicine team.   Dicussed at  AM / PM rounds.   Chart, labs , films reviewed.    Total Time: CRITICAL CARE ATTENDING - CTICU    MEDICATIONS  (STANDING):  aspirin enteric coated 81 milliGRAM(s) Oral daily  cefuroxime  IVPB 1500 milliGRAM(s) IV Intermittent every 8 hours  chlorhexidine 0.12% Liquid 15 milliLiter(s) Oral Mucosa every 12 hours  chlorhexidine 2% Cloths 1 Application(s) Topical <User Schedule>  dexMEDEtomidine Infusion 0.2 MICROgram(s)/kG/Hr (2.85 mL/Hr) IV Continuous <Continuous>  dextrose 50% Injectable 50 milliLiter(s) IV Push every 15 minutes  dextrose 50% Injectable 25 milliLiter(s) IV Push every 15 minutes  DOBUTamine Infusion 2.5 MICROgram(s)/kG/Min (4.275 mL/Hr) IV Continuous <Continuous>  famotidine Injectable 20 milliGRAM(s) IV Push daily  influenza   Vaccine 0.5 milliLiter(s) IntraMuscular once  insulin regular Infusion 3 Unit(s)/Hr (3 mL/Hr) IV Continuous <Continuous>  meperidine     Injectable 25 milliGRAM(s) IV Push once  metoclopramide Injectable 10 milliGRAM(s) IV Push every 8 hours  norepinephrine Infusion 0.06 MICROgram(s)/kG/Min (6.412 mL/Hr) IV Continuous <Continuous>  polyethylene glycol 3350 17 Gram(s) Oral daily  potassium chloride  10 mEq/50 mL IVPB 10 milliEquivalent(s) IV Intermittent every 1 hour  potassium chloride  10 mEq/50 mL IVPB 10 milliEquivalent(s) IV Intermittent every 1 hour  potassium chloride  10 mEq/50 mL IVPB 10 milliEquivalent(s) IV Intermittent every 1 hour  sodium chloride 0.9%. 1000 milliLiter(s) (10 mL/Hr) IV Continuous <Continuous>                                    9.4    18.30 )-----------( 143      ( 2020 00:40 )             26.6       01-03    138  |  103  |  14  ----------------------------<  127<H>  4.2   |  24  |  0.86    Ca    8.8      2020 00:40  Phos  2.7     01-03  Mg     2.4     01-03    TPro  6.1  /  Alb  4.9  /  TBili  0.5  /  DBili  x   /  AST  25  /  ALT  12  /  AlkPhos  27<L>  01-03      PT/INR - ( 2020 00:40 )   PT: 13.7 sec;   INR: 1.19 ratio         PTT - ( 2020 00:40 )  PTT:37.9 sec    Mode: CPAP with PS  FiO2: 40  PEEP: 5  PS: 5  MAP: 8  PIP: 11      Daily     Daily Weight in k.2 (2020 00:00)       @ 07:  -   @ 07:00  --------------------------------------------------------  IN: 770 mL / OUT: 300 mL / NET: 470 mL     @ 07:  -   @ 06:41  --------------------------------------------------------  IN: 3726.8 mL / OUT: 2400 mL / NET: 1326.8 mL        Critically Ill patient  : [ ] preoperative ,   [ x] post operative    Requires :  [ x] Arterial Line   [ x] Central Line  [ ] PA catheter  [ ] IABP  [ ] ECMO  [ ] LVAD  [ ] Ventilator  [x ] pacemaker [ ] Impella.                      [ x] ABG's     [ x] Pulse Oxymetry Monitoring  Bedside evaluation , monitoring , treatment of hemodynamics , fluids , IVP/ IVCD meds.        Diagnosis:     POD 1 - CABG X 3 L    NSTEMI    Hypotension    Hypovolemia    Hemodynamic lability,  instability. Requires IVCD [ x] vasopressors [ x] inotropes  [ ] vasodilator  [ x]IVSS fluid  to maintain MAP, perfusion, C.I.     Ventilator Management:  [ ]AC-rest    [x ]CPAP-PS Wean this PM     [ ]Trach Collar     [ ]Extubate    [ ] T-Piece  [ ]peep>5     Requires chest PT, pulmonary toilet, suctioning to maintain SaO2,  patent airway and treat atelectasis.     ECG     Chest Tube Drainage     h/o ETOH / Smoking     DT's recently- would place on Precedex post op     IVCD Insulin     Temporary pacemaker (TPM) interrogation and setting.     Thrombocytopenia         Discussed with CT surgeon, Physician's Assistant - Nurse Practitioner- Critical care medicine team.   Dicussed at  AM / PM rounds.   Chart, labs , films reviewed.    Total Time:     By signing my name below, I, Mary Jones, attest that this documentation has been prepared under the direction and in the presence of Fernandez James MD   Electronically Signed: Hayde Youssef [unfilled] [unfilled] CRITICAL CARE ATTENDING - CTICU    MEDICATIONS  (STANDING):  aspirin enteric coated 81 milliGRAM(s) Oral daily  cefuroxime  IVPB 1500 milliGRAM(s) IV Intermittent every 8 hours  chlorhexidine 0.12% Liquid 15 milliLiter(s) Oral Mucosa every 12 hours  chlorhexidine 2% Cloths 1 Application(s) Topical <User Schedule>  dexMEDEtomidine Infusion 0.2 MICROgram(s)/kG/Hr (2.85 mL/Hr) IV Continuous <Continuous>  dextrose 50% Injectable 50 milliLiter(s) IV Push every 15 minutes  dextrose 50% Injectable 25 milliLiter(s) IV Push every 15 minutes  DOBUTamine Infusion 2.5 MICROgram(s)/kG/Min (4.275 mL/Hr) IV Continuous <Continuous>  famotidine Injectable 20 milliGRAM(s) IV Push daily  influenza   Vaccine 0.5 milliLiter(s) IntraMuscular once  insulin regular Infusion 3 Unit(s)/Hr (3 mL/Hr) IV Continuous <Continuous>  meperidine     Injectable 25 milliGRAM(s) IV Push once  metoclopramide Injectable 10 milliGRAM(s) IV Push every 8 hours  norepinephrine Infusion 0.06 MICROgram(s)/kG/Min (6.412 mL/Hr) IV Continuous <Continuous>  polyethylene glycol 3350 17 Gram(s) Oral daily  potassium chloride  10 mEq/50 mL IVPB 10 milliEquivalent(s) IV Intermittent every 1 hour  potassium chloride  10 mEq/50 mL IVPB 10 milliEquivalent(s) IV Intermittent every 1 hour  potassium chloride  10 mEq/50 mL IVPB 10 milliEquivalent(s) IV Intermittent every 1 hour  sodium chloride 0.9%. 1000 milliLiter(s) (10 mL/Hr) IV Continuous <Continuous>                                    9.4    18.30 )-----------( 143      ( 2020 00:40 )             26.6       01-03    138  |  103  |  14  ----------------------------<  127<H>  4.2   |  24  |  0.86    Ca    8.8      2020 00:40  Phos  2.7     01-03  Mg     2.4     01-03    TPro  6.1  /  Alb  4.9  /  TBili  0.5  /  DBili  x   /  AST  25  /  ALT  12  /  AlkPhos  27<L>  01-03      PT/INR - ( 2020 00:40 )   PT: 13.7 sec;   INR: 1.19 ratio         PTT - ( 2020 00:40 )  PTT:37.9 sec    Mode: CPAP with PS  FiO2: 40  PEEP: 5  PS: 5  MAP: 8  PIP: 11      Daily     Daily Weight in k.2 (2020 00:00)       @ 07:  -   @ 07:00  --------------------------------------------------------  IN: 770 mL / OUT: 300 mL / NET: 470 mL     @ 07:  -   @ 06:41  --------------------------------------------------------  IN: 3726.8 mL / OUT: 2400 mL / NET: 1326.8 mL        Critically Ill patient  : [ ] preoperative ,   [ x] post operative    Requires :  [ x] Arterial Line   [ x] Central Line  [ ] PA catheter  [ ] IABP  [ ] ECMO  [ ] LVAD  [ ] Ventilator  [x ] pacemaker [ ] Impella.                      [ x] ABG's     [ x] Pulse Oxymetry Monitoring  Bedside evaluation , monitoring , treatment of hemodynamics , fluids , IVP/ IVCD meds.        Diagnosis:     POD 1 - CABG X 3 L    NSTEMI    Hypotension    Hypovolemia    Hemodynamic lability,  instability. Requires IVCD [ x] vasopressors [ x] inotropes  [ ] vasodilator  [ x]IVSS fluid  to maintain MAP, perfusion, C.I.     Ventilator Management:  [ ]AC-rest    [x ]CPAP-PS Wean this PM     [ ]Trach Collar     [ ]Extubate    [ ] T-Piece  [ ]peep>5     Requires chest PT, pulmonary toilet, suctioning to maintain SaO2,  patent airway and treat atelectasis.     ECG     Chest Tube Drainage     h/o ETOH / Smoking     DT's recently- would place on Precedex post op     IVCD Insulin     Temporary pacemaker (TPM) interrogation and setting.     Thrombocytopenia         Discussed with CT surgeon, Physician's Assistant - Nurse Practitioner- Critical care medicine team.   Dicussed at  AM / PM rounds.   Chart, labs , films reviewed.    Total Time:     By signing my name below, I, Mary Jones, attest that this documentation has been prepared under the direction and in the presence of Fernandez James MD   Electronically Signed: Anita Youssef. CRITICAL CARE ATTENDING - CTICU    MEDICATIONS  (STANDING):  aspirin enteric coated 81 milliGRAM(s) Oral daily  cefuroxime  IVPB 1500 milliGRAM(s) IV Intermittent every 8 hours  chlorhexidine 0.12% Liquid 15 milliLiter(s) Oral Mucosa every 12 hours  chlorhexidine 2% Cloths 1 Application(s) Topical <User Schedule>  dexMEDEtomidine Infusion 0.2 MICROgram(s)/kG/Hr (2.85 mL/Hr) IV Continuous <Continuous>  dextrose 50% Injectable 50 milliLiter(s) IV Push every 15 minutes  dextrose 50% Injectable 25 milliLiter(s) IV Push every 15 minutes  DOBUTamine Infusion 2.5 MICROgram(s)/kG/Min (4.275 mL/Hr) IV Continuous <Continuous>  famotidine Injectable 20 milliGRAM(s) IV Push daily  influenza   Vaccine 0.5 milliLiter(s) IntraMuscular once  insulin regular Infusion 3 Unit(s)/Hr (3 mL/Hr) IV Continuous <Continuous>  meperidine     Injectable 25 milliGRAM(s) IV Push once  metoclopramide Injectable 10 milliGRAM(s) IV Push every 8 hours  norepinephrine Infusion 0.06 MICROgram(s)/kG/Min (6.412 mL/Hr) IV Continuous <Continuous>  polyethylene glycol 3350 17 Gram(s) Oral daily  potassium chloride  10 mEq/50 mL IVPB 10 milliEquivalent(s) IV Intermittent every 1 hour  potassium chloride  10 mEq/50 mL IVPB 10 milliEquivalent(s) IV Intermittent every 1 hour  potassium chloride  10 mEq/50 mL IVPB 10 milliEquivalent(s) IV Intermittent every 1 hour  sodium chloride 0.9%. 1000 milliLiter(s) (10 mL/Hr) IV Continuous <Continuous>                                    9.4    18.30 )-----------( 143      ( 2020 00:40 )             26.6       01-03    138  |  103  |  14  ----------------------------<  127<H>  4.2   |  24  |  0.86    Ca    8.8      2020 00:40  Phos  2.7     01-03  Mg     2.4     01-03    TPro  6.1  /  Alb  4.9  /  TBili  0.5  /  DBili  x   /  AST  25  /  ALT  12  /  AlkPhos  27<L>  01-03      PT/INR - ( 2020 00:40 )   PT: 13.7 sec;   INR: 1.19 ratio         PTT - ( 2020 00:40 )  PTT:37.9 sec    Mode: CPAP with PS  FiO2: 40  PEEP: 5  PS: 5  MAP: 8  PIP: 11      Daily     Daily Weight in k.2 (2020 00:00)       @ 07:  -   @ 07:00  --------------------------------------------------------  IN: 770 mL / OUT: 300 mL / NET: 470 mL     @ 07:  -   @ 06:41  --------------------------------------------------------  IN: 3726.8 mL / OUT: 2400 mL / NET: 1326.8 mL        Critically Ill patient  : [ ] preoperative ,   [ x] post operative    Requires :  [ x] Arterial Line   [ x] Central Line  [ ] PA catheter  [ ] IABP  [ ] ECMO  [ ] LVAD  [ ] Ventilator  [x ] pacemaker [ ] Impella.                      [ x] ABG's     [ x] Pulse Oxymetry Monitoring  Bedside evaluation , monitoring , treatment of hemodynamics , fluids , IVP/ IVCD meds.        Diagnosis:     POD 1 - CABG X 3 L    NSTEMI    Hypotension - resolved      Hemodynamic lability,  instability. Requires IVCD [ ] vasopressors [ x] inotropes  [ ] vasodilator  [ x]IVSS fluid  to maintain MAP, perfusion, C.I.     Requires chest PT, pulmonary toilet, suctioning to maintain SaO2,  patent airway and treat atelectasis.     ECG     Chest Tube Drainage     h/o ETOH / Smoking     DT's recently- would place on Precedex post op     IVCD Insulin     Temporary pacemaker (TPM) interrogation and setting.     Thrombocytopenia     I, Fernandez James, personally performed the services described in this documentation. All medical record entries made by the scribe were at my direction and in my presence. I have reviewd the chart and agree that the record reflects my personal performance and is accurate and complete.   Fernandez James MD.         Discussed with CT surgeon, Physician's Assistant - Nurse Practitioner- Critical care medicine team.   Dicussed at  AM / PM rounds.   Chart, labs , films reviewed.    Total Time: 20 min    By signing my name below, I, Mary Jones, attest that this documentation has been prepared under the direction and in the presence of Fernandez James MD   Electronically Signed: Anita Youssef.

## 2020-01-03 NOTE — PHYSICAL THERAPY INITIAL EVALUATION ADULT - PERTINENT HX OF CURRENT PROBLEM, REHAB EVAL
64 y.o. M PMH of HTN, HLD, everyday tobacco use, ETOH abuse, & syncopal episode on 12/15. (Suspect possibly ETOH withdrawal seizure.)  Diagnosed w/ NSTEMI, refused further evaluation, left AMA. Presented to [facility] on 12/28/19 c/o L sided chest pain ~15 min & radiation to the left arm, SOB, & dizziness. cardiac cath found multivessel CAD. Trans to [facility] now s/p CABG x 3 (1/2/20)

## 2020-01-03 NOTE — AIRWAY REMOVAL NOTE  ADULT & PEDS - ARTIFICAL AIRWAY REMOVAL COMMENTS
Written order for extubation verified. The patient was identified by full name and birth date compared to the identification band. RN present during the procedure.

## 2020-01-04 DIAGNOSIS — D72.829 ELEVATED WHITE BLOOD CELL COUNT, UNSPECIFIED: ICD-10-CM

## 2020-01-04 DIAGNOSIS — I31.9 DISEASE OF PERICARDIUM, UNSPECIFIED: ICD-10-CM

## 2020-01-04 LAB
ALBUMIN SERPL ELPH-MCNC: 4.5 G/DL — SIGNIFICANT CHANGE UP (ref 3.3–5)
ALP SERPL-CCNC: 34 U/L — LOW (ref 40–120)
ALT FLD-CCNC: 15 U/L — SIGNIFICANT CHANGE UP (ref 10–45)
ANION GAP SERPL CALC-SCNC: 11 MMOL/L — SIGNIFICANT CHANGE UP (ref 5–17)
AST SERPL-CCNC: 25 U/L — SIGNIFICANT CHANGE UP (ref 10–40)
BILIRUB SERPL-MCNC: 0.3 MG/DL — SIGNIFICANT CHANGE UP (ref 0.2–1.2)
BUN SERPL-MCNC: 21 MG/DL — SIGNIFICANT CHANGE UP (ref 7–23)
CALCIUM SERPL-MCNC: 9.2 MG/DL — SIGNIFICANT CHANGE UP (ref 8.4–10.5)
CHLORIDE SERPL-SCNC: 99 MMOL/L — SIGNIFICANT CHANGE UP (ref 96–108)
CO2 SERPL-SCNC: 22 MMOL/L — SIGNIFICANT CHANGE UP (ref 22–31)
CREAT SERPL-MCNC: 0.78 MG/DL — SIGNIFICANT CHANGE UP (ref 0.5–1.3)
GAS PNL BLDA: SIGNIFICANT CHANGE UP
GLUCOSE SERPL-MCNC: 142 MG/DL — HIGH (ref 70–99)
HCT VFR BLD CALC: 28.4 % — LOW (ref 39–50)
HGB BLD-MCNC: 9.8 G/DL — LOW (ref 13–17)
MAGNESIUM SERPL-MCNC: 2.4 MG/DL — SIGNIFICANT CHANGE UP (ref 1.6–2.6)
MCHC RBC-ENTMCNC: 32.9 PG — SIGNIFICANT CHANGE UP (ref 27–34)
MCHC RBC-ENTMCNC: 34.5 GM/DL — SIGNIFICANT CHANGE UP (ref 32–36)
MCV RBC AUTO: 95.3 FL — SIGNIFICANT CHANGE UP (ref 80–100)
NRBC # BLD: 0 /100 WBCS — SIGNIFICANT CHANGE UP (ref 0–0)
PHOSPHATE SERPL-MCNC: 2.8 MG/DL — SIGNIFICANT CHANGE UP (ref 2.5–4.5)
PLATELET # BLD AUTO: 146 K/UL — LOW (ref 150–400)
POTASSIUM SERPL-MCNC: 4.6 MMOL/L — SIGNIFICANT CHANGE UP (ref 3.5–5.3)
POTASSIUM SERPL-SCNC: 4.6 MMOL/L — SIGNIFICANT CHANGE UP (ref 3.5–5.3)
PROT SERPL-MCNC: 6.2 G/DL — SIGNIFICANT CHANGE UP (ref 6–8.3)
RBC # BLD: 2.98 M/UL — LOW (ref 4.2–5.8)
RBC # FLD: 15.2 % — HIGH (ref 10.3–14.5)
SODIUM SERPL-SCNC: 132 MMOL/L — LOW (ref 135–145)
WBC # BLD: 31.02 K/UL — HIGH (ref 3.8–10.5)
WBC # FLD AUTO: 31.02 K/UL — HIGH (ref 3.8–10.5)

## 2020-01-04 PROCEDURE — 93926 LOWER EXTREMITY STUDY: CPT | Mod: 26,RT

## 2020-01-04 PROCEDURE — 71045 X-RAY EXAM CHEST 1 VIEW: CPT | Mod: 26,76

## 2020-01-04 PROCEDURE — 93010 ELECTROCARDIOGRAM REPORT: CPT

## 2020-01-04 PROCEDURE — 99232 SBSQ HOSP IP/OBS MODERATE 35: CPT

## 2020-01-04 RX ORDER — POTASSIUM CHLORIDE 20 MEQ
10 PACKET (EA) ORAL ONCE
Refills: 0 | Status: COMPLETED | OUTPATIENT
Start: 2020-01-04 | End: 2020-01-04

## 2020-01-04 RX ORDER — SODIUM CHLORIDE 9 MG/ML
3 INJECTION INTRAMUSCULAR; INTRAVENOUS; SUBCUTANEOUS EVERY 8 HOURS
Refills: 0 | Status: DISCONTINUED | OUTPATIENT
Start: 2020-01-04 | End: 2020-01-06

## 2020-01-04 RX ADMIN — Medication 10 MILLIGRAM(S): at 05:24

## 2020-01-04 RX ADMIN — Medication 50 MILLIGRAM(S): at 05:25

## 2020-01-04 RX ADMIN — Medication 2: at 21:51

## 2020-01-04 RX ADMIN — Medication 10 MILLIGRAM(S): at 12:58

## 2020-01-04 RX ADMIN — Medication 81 MILLIGRAM(S): at 12:58

## 2020-01-04 RX ADMIN — ATORVASTATIN CALCIUM 80 MILLIGRAM(S): 80 TABLET, FILM COATED ORAL at 21:08

## 2020-01-04 RX ADMIN — SODIUM CHLORIDE 3 MILLILITER(S): 9 INJECTION INTRAMUSCULAR; INTRAVENOUS; SUBCUTANEOUS at 21:07

## 2020-01-04 RX ADMIN — ENOXAPARIN SODIUM 40 MILLIGRAM(S): 100 INJECTION SUBCUTANEOUS at 12:59

## 2020-01-04 RX ADMIN — OXYCODONE AND ACETAMINOPHEN 1 TABLET(S): 5; 325 TABLET ORAL at 12:58

## 2020-01-04 RX ADMIN — Medication 50 MILLIGRAM(S): at 17:22

## 2020-01-04 RX ADMIN — CHLORHEXIDINE GLUCONATE 1 APPLICATION(S): 213 SOLUTION TOPICAL at 05:25

## 2020-01-04 RX ADMIN — Medication 2: at 18:17

## 2020-01-04 RX ADMIN — PANTOPRAZOLE SODIUM 40 MILLIGRAM(S): 20 TABLET, DELAYED RELEASE ORAL at 05:25

## 2020-01-04 RX ADMIN — Medication 100 MILLIGRAM(S): at 01:13

## 2020-01-04 RX ADMIN — Medication 2: at 12:30

## 2020-01-04 RX ADMIN — AMLODIPINE BESYLATE 10 MILLIGRAM(S): 2.5 TABLET ORAL at 05:24

## 2020-01-04 RX ADMIN — OXYCODONE AND ACETAMINOPHEN 1 TABLET(S): 5; 325 TABLET ORAL at 13:30

## 2020-01-04 NOTE — PROGRESS NOTE ADULT - PROBLEM SELECTOR PLAN 1
s/p C3L  Continue asa, ator, lopressor 50 BID  Ambulate daily  Incentive spirometry  CXR in am pt with small left apical ptx post chest tube pull

## 2020-01-04 NOTE — PROGRESS NOTE ADULT - ASSESSMENT
65 yo [place], English speaking male with pmh of HTN, HLD, everyday tobacco use, ETOH abuse with consumption of roughly a quart of hard liquor daily, and syncopal episode on 12/15 while on vacation in [place].  The seizure was observed by family and was associated with foaming at the mouth and resulted in a tongue laceration.  Suspect possibly ETOH withdrawal seizure.  Work up with CT-Head and CT-Angio were negative and reports are on chart for review.  In addition, he was diagnosed with NSTEMI, refused further evaluation and  left AMA.  He presented to [facility] on 12/28/19 after experiencing left sided chest pain which lasted roughly 15 minutes and was associated with radiation to the left arm, SOB, and dizziness.   He underwent cardiac cath and was found to have multivessel CAD.  1/2 S/p C3L  1/4: transferred to [address] [place] [address]. VSS. SR 80s Small Left PTX noted on CXR post pull Left chest tube. Pericarditis on EKG-observe at this time 63 yo [place], English speaking male with pmh of HTN, HLD, everyday tobacco use, ETOH abuse with consumption of roughly a quart of hard liquor daily, and syncopal episode on 12/15 while on vacation in [place].  The seizure was observed by family and was associated with foaming at the mouth and resulted in a tongue laceration.  Suspect possibly ETOH withdrawal seizure.  Work up with CT-Head and CT-Angio were negative and reports are on chart for review.  In addition, he was diagnosed with NSTEMI, refused further evaluation and  left AMA.  He presented to [facility] on 12/28/19 after experiencing left sided chest pain which lasted roughly 15 minutes and was associated with radiation to the left arm, SOB, and dizziness.   He underwent cardiac cath and was found to have multivessel CAD.  1/2 S/p C3L  1/4: transferred to [address] [place] [address]. VSS. SR 80s Small Left PTX noted on CXR post pull Left chest tube.

## 2020-01-04 NOTE — PROGRESS NOTE ADULT - SUBJECTIVE AND OBJECTIVE BOX
CRITICAL CARE ATTENDING - CTICU    MEDICATIONS  (STANDING):  amLODIPine   Tablet 10 milliGRAM(s) Oral daily  aspirin enteric coated 81 milliGRAM(s) Oral daily  atorvastatin 80 milliGRAM(s) Oral at bedtime  chlorhexidine 2% Cloths 1 Application(s) Topical <User Schedule>  dextrose 50% Injectable 50 milliLiter(s) IV Push every 15 minutes  dextrose 50% Injectable 25 milliLiter(s) IV Push every 15 minutes  enoxaparin Injectable 40 milliGRAM(s) SubCutaneous daily  influenza   Vaccine 0.5 milliLiter(s) IntraMuscular once  insulin lispro (HumaLOG) corrective regimen sliding scale   SubCutaneous Before meals and at bedtime  metoclopramide Injectable 10 milliGRAM(s) IV Push every 8 hours  metoprolol tartrate 50 milliGRAM(s) Oral two times a day  pantoprazole    Tablet 40 milliGRAM(s) Oral before breakfast  polyethylene glycol 3350 17 Gram(s) Oral daily  sodium chloride 0.9%. 1000 milliLiter(s) (10 mL/Hr) IV Continuous <Continuous>                                    9.8    31.02 )-----------( 146      ( 2020 01:35 )             28.4       01-04    132<L>  |  99  |  21  ----------------------------<  142<H>  4.6   |  22  |  0.78    Ca    9.2      2020 01:35  Phos  2.8     01-04  Mg     2.4     -04    TPro  6.2  /  Alb  4.5  /  TBili  0.3  /  DBili  x   /  AST  25  /  ALT  15  /  AlkPhos  34<L>  01-04      PT/INR - ( 2020 00:40 )   PT: 13.7 sec;   INR: 1.19 ratio         PTT - ( 2020 00:40 )  PTT:37.9 sec        Daily     Daily Weight in k (2020 20:00)      -03 @ 07:01  -  -04 @ 07:00  --------------------------------------------------------  IN: 1376 mL / OUT: 1555 mL / NET: -179 mL        Critically Ill patient  : [ ] preoperative ,   [x ] post operative    Requires :  [x ] Arterial Line   [x ] Central Line  [ ] PA catheter  [ ] IABP  [ ] ECMO  [ ] LVAD  [ ] Ventilator  [ ] pacemaker [ ] Impella.                      [x ] ABG's     [x ] Pulse Oxymetry Monitoring  Bedside evaluation , monitoring , treatment of hemodynamics , fluids , IVP/ IVCD meds.        Diagnosis:     POD 2 - CABG X 3 L    NSTEMI    Hypotension - resolved    Hemodynamic lability,  instability. Requires IVCD [ ] vasopressors [ ] inotropes  [ ] vasodilator  [ x]IVSS fluid  to maintain MAP, perfusion, C.I.     Requires chest PT, pulmonary toilet, suctioning to maintain SaO2,  patent airway and treat atelectasis.     ECG     Chest Tube Drainage     h/o ETOH / Smoking     DT's recently- would place on Precedex post op     Thrombocytopenia     Hyponatremia           By signing my name below, I, Mary Jones, attest that this documentation has been prepared under the direction and in the presence of Fernandez James MD  Electronically Signed: Anita Youssef. 20 @ 07:10    Discussed with CT surgeon, Physician's Assistant - Nurse Practitioner- Critical care medicine team.   Dicussed at  AM / PM rounds.   Chart, labs , films reviewed.    Total Time: CRITICAL CARE ATTENDING - CTICU    MEDICATIONS  (STANDING):  amLODIPine   Tablet 10 milliGRAM(s) Oral daily  aspirin enteric coated 81 milliGRAM(s) Oral daily  atorvastatin 80 milliGRAM(s) Oral at bedtime  chlorhexidine 2% Cloths 1 Application(s) Topical <User Schedule>  dextrose 50% Injectable 50 milliLiter(s) IV Push every 15 minutes  dextrose 50% Injectable 25 milliLiter(s) IV Push every 15 minutes  enoxaparin Injectable 40 milliGRAM(s) SubCutaneous daily  influenza   Vaccine 0.5 milliLiter(s) IntraMuscular once  insulin lispro (HumaLOG) corrective regimen sliding scale   SubCutaneous Before meals and at bedtime  metoclopramide Injectable 10 milliGRAM(s) IV Push every 8 hours  metoprolol tartrate 50 milliGRAM(s) Oral two times a day  pantoprazole    Tablet 40 milliGRAM(s) Oral before breakfast  polyethylene glycol 3350 17 Gram(s) Oral daily  sodium chloride 0.9%. 1000 milliLiter(s) (10 mL/Hr) IV Continuous <Continuous>                                    9.8    31.02 )-----------( 146      ( 2020 01:35 )             28.4       01-04    132<L>  |  99  |  21  ----------------------------<  142<H>  4.6   |  22  |  0.78    Ca    9.2      2020 01:35  Phos  2.8     01-04  Mg     2.4     -04    TPro  6.2  /  Alb  4.5  /  TBili  0.3  /  DBili  x   /  AST  25  /  ALT  15  /  AlkPhos  34<L>  01-04      PT/INR - ( 2020 00:40 )   PT: 13.7 sec;   INR: 1.19 ratio         PTT - ( 2020 00:40 )  PTT:37.9 sec        Daily     Daily Weight in k (2020 20:00)      -03 @ 07:01  -  -04 @ 07:00  --------------------------------------------------------  IN: 1376 mL / OUT: 1555 mL / NET: -179 mL        Critically Ill patient  : [ ] preoperative ,   [x ] post operative    Requires :  [x ] Arterial Line   [x ] Central Line  [ ] PA catheter  [ ] IABP  [ ] ECMO  [ ] LVAD  [ ] Ventilator  [ ] pacemaker [ ] Impella.                      [x ] ABG's     [x ] Pulse Oxymetry Monitoring  Bedside evaluation , monitoring , treatment of hemodynamics , fluids , IVP/ IVCD meds.        Diagnosis:     POD 2 - CABG X 3 L    NSTEMI    Hypotension - resolved    Hemodynamic lability,  instability. Requires IVCD [ ] vasopressors [ ] inotropes  [ ] vasodilator  [ x]IVSS fluid  to maintain MAP, perfusion, C.I.     Requires chest PT, pulmonary toilet, suctioning to maintain SaO2,  patent airway and treat atelectasis.     ECG - pericarditis     Chest Tube Drainage     h/o ETOH / Smoking     DT's recently - no evidence of so far    Thrombocytopenia     Hyponatremia     Leukocytosis     Requires bedside physical therapy, mobilization and total intermediate care.     I, Fernandez James, personally performed the services described in this documentation. All medical record entries made by the scribe were at my direction and in my presence. I have reviewed the chart and agree that the record reflects my personal performance and is accurate and complete.   Fernandez James MD.               By signing my name below, I, Mary Jones, attest that this documentation has been prepared under the direction and in the presence of Fernandez James MD  Electronically Signed: Anita Youssef. 20 @ 07:10    Discussed with CT surgeon, Physician's Assistant - Nurse Practitioner- Critical care medicine team.   Dicussed at  AM / PM rounds.   Chart, labs , films reviewed.    Total Time: 20 min

## 2020-01-05 LAB
ALBUMIN SERPL ELPH-MCNC: 4.3 G/DL — SIGNIFICANT CHANGE UP (ref 3.3–5)
ALP SERPL-CCNC: 36 U/L — LOW (ref 40–120)
ALT FLD-CCNC: 16 U/L — SIGNIFICANT CHANGE UP (ref 10–45)
ANION GAP SERPL CALC-SCNC: 13 MMOL/L — SIGNIFICANT CHANGE UP (ref 5–17)
AST SERPL-CCNC: 23 U/L — SIGNIFICANT CHANGE UP (ref 10–40)
BILIRUB SERPL-MCNC: 0.5 MG/DL — SIGNIFICANT CHANGE UP (ref 0.2–1.2)
BUN SERPL-MCNC: 24 MG/DL — HIGH (ref 7–23)
CALCIUM SERPL-MCNC: 9.2 MG/DL — SIGNIFICANT CHANGE UP (ref 8.4–10.5)
CHLORIDE SERPL-SCNC: 98 MMOL/L — SIGNIFICANT CHANGE UP (ref 96–108)
CO2 SERPL-SCNC: 23 MMOL/L — SIGNIFICANT CHANGE UP (ref 22–31)
CREAT SERPL-MCNC: 0.91 MG/DL — SIGNIFICANT CHANGE UP (ref 0.5–1.3)
GLUCOSE SERPL-MCNC: 99 MG/DL — SIGNIFICANT CHANGE UP (ref 70–99)
HCT VFR BLD CALC: 29.1 % — LOW (ref 39–50)
HGB BLD-MCNC: 9.6 G/DL — LOW (ref 13–17)
MCHC RBC-ENTMCNC: 32.4 PG — SIGNIFICANT CHANGE UP (ref 27–34)
MCHC RBC-ENTMCNC: 33 GM/DL — SIGNIFICANT CHANGE UP (ref 32–36)
MCV RBC AUTO: 98.3 FL — SIGNIFICANT CHANGE UP (ref 80–100)
NRBC # BLD: 0 /100 WBCS — SIGNIFICANT CHANGE UP (ref 0–0)
PLATELET # BLD AUTO: 144 K/UL — LOW (ref 150–400)
POTASSIUM SERPL-MCNC: 4.5 MMOL/L — SIGNIFICANT CHANGE UP (ref 3.5–5.3)
POTASSIUM SERPL-SCNC: 4.5 MMOL/L — SIGNIFICANT CHANGE UP (ref 3.5–5.3)
PROT SERPL-MCNC: 6.5 G/DL — SIGNIFICANT CHANGE UP (ref 6–8.3)
RBC # BLD: 2.96 M/UL — LOW (ref 4.2–5.8)
RBC # FLD: 14.6 % — HIGH (ref 10.3–14.5)
SODIUM SERPL-SCNC: 134 MMOL/L — LOW (ref 135–145)
WBC # BLD: 25.19 K/UL — HIGH (ref 3.8–10.5)
WBC # FLD AUTO: 25.19 K/UL — HIGH (ref 3.8–10.5)

## 2020-01-05 PROCEDURE — 71045 X-RAY EXAM CHEST 1 VIEW: CPT | Mod: 26

## 2020-01-05 RX ADMIN — POLYETHYLENE GLYCOL 3350 17 GRAM(S): 17 POWDER, FOR SOLUTION ORAL at 11:36

## 2020-01-05 RX ADMIN — SODIUM CHLORIDE 3 MILLILITER(S): 9 INJECTION INTRAMUSCULAR; INTRAVENOUS; SUBCUTANEOUS at 05:39

## 2020-01-05 RX ADMIN — SODIUM CHLORIDE 3 MILLILITER(S): 9 INJECTION INTRAMUSCULAR; INTRAVENOUS; SUBCUTANEOUS at 22:12

## 2020-01-05 RX ADMIN — ENOXAPARIN SODIUM 40 MILLIGRAM(S): 100 INJECTION SUBCUTANEOUS at 11:36

## 2020-01-05 RX ADMIN — CHLORHEXIDINE GLUCONATE 1 APPLICATION(S): 213 SOLUTION TOPICAL at 11:37

## 2020-01-05 RX ADMIN — Medication 2: at 17:13

## 2020-01-05 RX ADMIN — Medication 50 MILLIGRAM(S): at 05:38

## 2020-01-05 RX ADMIN — Medication 50 MILLIGRAM(S): at 17:12

## 2020-01-05 RX ADMIN — AMLODIPINE BESYLATE 10 MILLIGRAM(S): 2.5 TABLET ORAL at 05:38

## 2020-01-05 RX ADMIN — SODIUM CHLORIDE 3 MILLILITER(S): 9 INJECTION INTRAMUSCULAR; INTRAVENOUS; SUBCUTANEOUS at 13:36

## 2020-01-05 RX ADMIN — Medication 81 MILLIGRAM(S): at 11:36

## 2020-01-05 RX ADMIN — ATORVASTATIN CALCIUM 80 MILLIGRAM(S): 80 TABLET, FILM COATED ORAL at 22:18

## 2020-01-05 RX ADMIN — OXYCODONE AND ACETAMINOPHEN 1 TABLET(S): 5; 325 TABLET ORAL at 11:37

## 2020-01-05 RX ADMIN — OXYCODONE AND ACETAMINOPHEN 1 TABLET(S): 5; 325 TABLET ORAL at 12:07

## 2020-01-05 RX ADMIN — PANTOPRAZOLE SODIUM 40 MILLIGRAM(S): 20 TABLET, DELAYED RELEASE ORAL at 05:38

## 2020-01-05 NOTE — PROGRESS NOTE ADULT - PROBLEM SELECTOR PLAN 1
s/p C3L  Continue asa, ator, lopressor 50 BID  Ambulate daily  Incentive spirometry  small left PTX stable

## 2020-01-05 NOTE — PROGRESS NOTE ADULT - ASSESSMENT
63 yo Qatari, English speaking male with pmh of HTN, HLD, everyday tobacco use, ETOH abuse with consumption of roughly a quart of hard liquor daily, and syncopal episode on 12/15 while on vacation in [place].  The seizure was observed by family and was associated with foaming at the mouth and resulted in a tongue laceration.  Suspect possibly ETOH withdrawal seizure.  Work up with CT-Head and CT-Angio were negative and reports are on chart for review.  In addition, he was diagnosed with NSTEMI, refused further evaluation and  left AMA.  He presented to [facility] on 12/28/19 after experiencing left sided chest pain which lasted roughly 15 minutes and was associated with radiation to the left arm, SOB, and dizziness.   He underwent cardiac cath and was found to have multivessel CAD.  1/2 S/p C3L  1/4: transferred to [address] [place] [address]. VSS. SR 80s Small Left PTX noted on CXR post pull Left chest tube.   1/5: No events overnight. VSS

## 2020-01-05 NOTE — PROGRESS NOTE ADULT - SUBJECTIVE AND OBJECTIVE BOX
VITAL SIGNS    Telemetry:    Vital Signs Last 24 Hrs  T(C): 37.3 (20 @ 04:22), Max: 37.4 (20 @ 19:53)  T(F): 99.1 (20 @ 04:22), Max: 99.4 (20 @ 19:53)  HR: 75 (20 07:00) (72 - 86)  BP: 122/76 (20 04:22) (106/67 - 122/76)  RR: 18 (20 04:22) (16 - 33)  SpO2: 97% (20 04:22) (94% - 99%)             07:01  -   07:00  --------------------------------------------------------  IN: 620 mL / OUT: 955 mL / NET: -335 mL     07:01  -   @ 10:50  --------------------------------------------------------  IN: 0 mL / OUT: 300 mL / NET: -300 mL       Daily     Daily Weight in k.4 (2020 07:38)  Admit Wt: Drug Dosing Weight  Height (cm): 180.34 (2020 19:05)  Weight (kg): 57 (2020 19:05)  BMI (kg/m2): 17.5 (2020 19:05)  BSA (m2): 1.73 (2020 19:05)     Daily Weight in k.4 (20 @ 07:38)    LABS      134<L>  |  98  |  24<H>  ----------------------------<  99  4.5   |  23  |  0.91    Ca    9.2      2020 05:51  Phos  2.8       Mg     2.4         TPro  6.5  /  Alb  4.3  /  TBili  0.5  /  DBili  x   /  AST  23  /  ALT  16  /  AlkPhos  36<L>                                   9.6    25.19 )-----------( 144      ( 2020 05:51 )             29.1                  Bilirubin Total, Serum: 0.5 mg/dL ( @ 05:51)    CAPILLARY BLOOD GLUCOSE      POCT Blood Glucose.: 123 mg/dL (2020 21:38)  POCT Blood Glucose.: 146 mg/dL (2020 17:49)  POCT Blood Glucose.: 142 mg/dL (2020 12:20)          Drains:     MS       [  ]   [  ]            L Pleural [  ]            R Pleural  [  ]            PAULINA  [  ]           Parker  [  ]    Pacing Wires      [  ]   Settings:                                  Isolated  [  ]                    CXR:      MEDICATIONS  amLODIPine   Tablet 10 milliGRAM(s) Oral daily  aspirin enteric coated 81 milliGRAM(s) Oral daily  atorvastatin 80 milliGRAM(s) Oral at bedtime  chlorhexidine 2% Cloths 1 Application(s) Topical <User Schedule>  dextrose 50% Injectable 50 milliLiter(s) IV Push every 15 minutes  dextrose 50% Injectable 25 milliLiter(s) IV Push every 15 minutes  enoxaparin Injectable 40 milliGRAM(s) SubCutaneous daily  influenza   Vaccine 0.5 milliLiter(s) IntraMuscular once  insulin lispro (HumaLOG) corrective regimen sliding scale   SubCutaneous Before meals and at bedtime  metoprolol tartrate 50 milliGRAM(s) Oral two times a day  oxycodone    5 mG/acetaminophen 325 mG 1 Tablet(s) Oral every 4 hours PRN  oxycodone    5 mG/acetaminophen 325 mG 2 Tablet(s) Oral every 6 hours PRN  pantoprazole    Tablet 40 milliGRAM(s) Oral before breakfast  polyethylene glycol 3350 17 Gram(s) Oral daily  sodium chloride 0.9% lock flush 3 milliLiter(s) IV Push every 8 hours      PHYSICAL EXAM      Neurology: alert and oriented x 3, nonfocal, no gross deficits  CV :S1S2  Sternal Wound :  CDI , Stable  Lungs: cta  Abdomen: soft, nontender, nondistended, positive bowel sounds, last bowel movement   :  voids     Extremities:   warm to touch               PAST MEDICAL & SURGICAL HISTORY:  Syncope  Cigarette smoker within last 12 months  ETOH abuse  Hyperlipidemia  HTN (hypertension)  CAD, multiple vessel  HLD (hyperlipidemia)  HTN (hypertension)  No significant past surgical history  No significant past surgical history                 Discussed with Cardiothoracic Team at AM rounds.

## 2020-01-06 ENCOUNTER — TRANSCRIPTION ENCOUNTER (OUTPATIENT)
Age: 65
End: 2020-01-06

## 2020-01-06 VITALS
TEMPERATURE: 98 F | OXYGEN SATURATION: 98 % | RESPIRATION RATE: 18 BRPM | DIASTOLIC BLOOD PRESSURE: 74 MMHG | HEART RATE: 68 BPM | SYSTOLIC BLOOD PRESSURE: 120 MMHG

## 2020-01-06 PROBLEM — I10 ESSENTIAL (PRIMARY) HYPERTENSION: Chronic | Status: ACTIVE | Noted: 2019-12-31

## 2020-01-06 PROBLEM — E78.5 HYPERLIPIDEMIA, UNSPECIFIED: Chronic | Status: ACTIVE | Noted: 2019-12-31

## 2020-01-06 PROBLEM — R55 SYNCOPE AND COLLAPSE: Chronic | Status: ACTIVE | Noted: 2019-12-31

## 2020-01-06 PROBLEM — F10.10 ALCOHOL ABUSE, UNCOMPLICATED: Chronic | Status: ACTIVE | Noted: 2019-12-31

## 2020-01-06 PROBLEM — Z87.891 PERSONAL HISTORY OF NICOTINE DEPENDENCE: Chronic | Status: ACTIVE | Noted: 2019-12-31

## 2020-01-06 PROBLEM — I25.10 ATHEROSCLEROTIC HEART DISEASE OF NATIVE CORONARY ARTERY WITHOUT ANGINA PECTORIS: Chronic | Status: ACTIVE | Noted: 2019-12-31

## 2020-01-06 LAB
ANION GAP SERPL CALC-SCNC: 12 MMOL/L — SIGNIFICANT CHANGE UP (ref 5–17)
BUN SERPL-MCNC: 21 MG/DL — SIGNIFICANT CHANGE UP (ref 7–23)
CALCIUM SERPL-MCNC: 9.2 MG/DL — SIGNIFICANT CHANGE UP (ref 8.4–10.5)
CHLORIDE SERPL-SCNC: 98 MMOL/L — SIGNIFICANT CHANGE UP (ref 96–108)
CO2 SERPL-SCNC: 23 MMOL/L — SIGNIFICANT CHANGE UP (ref 22–31)
CREAT SERPL-MCNC: 0.84 MG/DL — SIGNIFICANT CHANGE UP (ref 0.5–1.3)
GLUCOSE SERPL-MCNC: 97 MG/DL — SIGNIFICANT CHANGE UP (ref 70–99)
HCT VFR BLD CALC: 28.7 % — LOW (ref 39–50)
HGB BLD-MCNC: 9.5 G/DL — LOW (ref 13–17)
MCHC RBC-ENTMCNC: 32.5 PG — SIGNIFICANT CHANGE UP (ref 27–34)
MCHC RBC-ENTMCNC: 33.1 GM/DL — SIGNIFICANT CHANGE UP (ref 32–36)
MCV RBC AUTO: 98.3 FL — SIGNIFICANT CHANGE UP (ref 80–100)
NRBC # BLD: 1 /100 WBCS — HIGH (ref 0–0)
PLATELET # BLD AUTO: 148 K/UL — LOW (ref 150–400)
POTASSIUM SERPL-MCNC: 4 MMOL/L — SIGNIFICANT CHANGE UP (ref 3.5–5.3)
POTASSIUM SERPL-SCNC: 4 MMOL/L — SIGNIFICANT CHANGE UP (ref 3.5–5.3)
RBC # BLD: 2.92 M/UL — LOW (ref 4.2–5.8)
RBC # FLD: 14.2 % — SIGNIFICANT CHANGE UP (ref 10.3–14.5)
SODIUM SERPL-SCNC: 133 MMOL/L — LOW (ref 135–145)
WBC # BLD: 16.58 K/UL — HIGH (ref 3.8–10.5)
WBC # FLD AUTO: 16.58 K/UL — HIGH (ref 3.8–10.5)

## 2020-01-06 PROCEDURE — 84484 ASSAY OF TROPONIN QUANT: CPT

## 2020-01-06 PROCEDURE — 83520 IMMUNOASSAY QUANT NOS NONAB: CPT

## 2020-01-06 PROCEDURE — C1769: CPT

## 2020-01-06 PROCEDURE — 85014 HEMATOCRIT: CPT

## 2020-01-06 PROCEDURE — 82550 ASSAY OF CK (CPK): CPT

## 2020-01-06 PROCEDURE — 94002 VENT MGMT INPAT INIT DAY: CPT

## 2020-01-06 PROCEDURE — 85384 FIBRINOGEN ACTIVITY: CPT

## 2020-01-06 PROCEDURE — 82330 ASSAY OF CALCIUM: CPT

## 2020-01-06 PROCEDURE — 84100 ASSAY OF PHOSPHORUS: CPT

## 2020-01-06 PROCEDURE — 87640 STAPH A DNA AMP PROBE: CPT

## 2020-01-06 PROCEDURE — 97116 GAIT TRAINING THERAPY: CPT

## 2020-01-06 PROCEDURE — 85027 COMPLETE CBC AUTOMATED: CPT

## 2020-01-06 PROCEDURE — C1889: CPT

## 2020-01-06 PROCEDURE — 86850 RBC ANTIBODY SCREEN: CPT

## 2020-01-06 PROCEDURE — 86901 BLOOD TYPING SEROLOGIC RH(D): CPT

## 2020-01-06 PROCEDURE — 83036 HEMOGLOBIN GLYCOSYLATED A1C: CPT

## 2020-01-06 PROCEDURE — 71045 X-RAY EXAM CHEST 1 VIEW: CPT

## 2020-01-06 PROCEDURE — 82947 ASSAY GLUCOSE BLOOD QUANT: CPT

## 2020-01-06 PROCEDURE — 71250 CT THORAX DX C-: CPT

## 2020-01-06 PROCEDURE — 93926 LOWER EXTREMITY STUDY: CPT

## 2020-01-06 PROCEDURE — 81003 URINALYSIS AUTO W/O SCOPE: CPT

## 2020-01-06 PROCEDURE — 86923 COMPATIBILITY TEST ELECTRIC: CPT

## 2020-01-06 PROCEDURE — P9045: CPT

## 2020-01-06 PROCEDURE — 80048 BASIC METABOLIC PNL TOTAL CA: CPT

## 2020-01-06 PROCEDURE — 80053 COMPREHEN METABOLIC PANEL: CPT

## 2020-01-06 PROCEDURE — P9047: CPT

## 2020-01-06 PROCEDURE — 97161 PT EVAL LOW COMPLEX 20 MIN: CPT

## 2020-01-06 PROCEDURE — 82553 CREATINE MB FRACTION: CPT

## 2020-01-06 PROCEDURE — 82435 ASSAY OF BLOOD CHLORIDE: CPT

## 2020-01-06 PROCEDURE — 84295 ASSAY OF SERUM SODIUM: CPT

## 2020-01-06 PROCEDURE — 86900 BLOOD TYPING SEROLOGIC ABO: CPT

## 2020-01-06 PROCEDURE — 83735 ASSAY OF MAGNESIUM: CPT

## 2020-01-06 PROCEDURE — 82565 ASSAY OF CREATININE: CPT

## 2020-01-06 PROCEDURE — 85610 PROTHROMBIN TIME: CPT

## 2020-01-06 PROCEDURE — 93880 EXTRACRANIAL BILAT STUDY: CPT

## 2020-01-06 PROCEDURE — 83605 ASSAY OF LACTIC ACID: CPT

## 2020-01-06 PROCEDURE — 84132 ASSAY OF SERUM POTASSIUM: CPT

## 2020-01-06 PROCEDURE — 94010 BREATHING CAPACITY TEST: CPT

## 2020-01-06 PROCEDURE — 36430 TRANSFUSION BLD/BLD COMPNT: CPT

## 2020-01-06 PROCEDURE — 93005 ELECTROCARDIOGRAM TRACING: CPT

## 2020-01-06 PROCEDURE — P9016: CPT

## 2020-01-06 PROCEDURE — 85576 BLOOD PLATELET AGGREGATION: CPT

## 2020-01-06 PROCEDURE — 82962 GLUCOSE BLOOD TEST: CPT

## 2020-01-06 PROCEDURE — P9041: CPT

## 2020-01-06 PROCEDURE — 93306 TTE W/DOPPLER COMPLETE: CPT

## 2020-01-06 PROCEDURE — 86891 AUTOLOGOUS BLOOD OP SALVAGE: CPT

## 2020-01-06 PROCEDURE — 87641 MR-STAPH DNA AMP PROBE: CPT

## 2020-01-06 PROCEDURE — 85730 THROMBOPLASTIN TIME PARTIAL: CPT

## 2020-01-06 PROCEDURE — 82803 BLOOD GASES ANY COMBINATION: CPT

## 2020-01-06 RX ORDER — POLYETHYLENE GLYCOL 3350 17 G/17G
17 POWDER, FOR SOLUTION ORAL
Qty: 1700 | Refills: 0
Start: 2020-01-06 | End: 2020-02-04

## 2020-01-06 RX ORDER — ATORVASTATIN CALCIUM 80 MG/1
1 TABLET, FILM COATED ORAL
Qty: 30 | Refills: 0
Start: 2020-01-06 | End: 2020-02-04

## 2020-01-06 RX ORDER — PANTOPRAZOLE SODIUM 20 MG/1
1 TABLET, DELAYED RELEASE ORAL
Qty: 30 | Refills: 0
Start: 2020-01-06 | End: 2020-02-04

## 2020-01-06 RX ORDER — ATORVASTATIN CALCIUM 80 MG/1
1 TABLET, FILM COATED ORAL
Qty: 0 | Refills: 0 | DISCHARGE

## 2020-01-06 RX ORDER — AMLODIPINE BESYLATE 2.5 MG/1
1 TABLET ORAL
Qty: 30 | Refills: 0
Start: 2020-01-06 | End: 2020-02-04

## 2020-01-06 RX ORDER — AMLODIPINE BESYLATE 2.5 MG/1
1 TABLET ORAL
Qty: 0 | Refills: 0 | DISCHARGE

## 2020-01-06 RX ORDER — ASPIRIN/CALCIUM CARB/MAGNESIUM 324 MG
1 TABLET ORAL
Qty: 30 | Refills: 0
Start: 2020-01-06 | End: 2020-02-04

## 2020-01-06 RX ORDER — SORBITOL SOLUTION 70 %
30 SOLUTION, ORAL MISCELLANEOUS ONCE
Refills: 0 | Status: COMPLETED | OUTPATIENT
Start: 2020-01-06 | End: 2020-01-06

## 2020-01-06 RX ORDER — ASPIRIN/CALCIUM CARB/MAGNESIUM 324 MG
1 TABLET ORAL
Qty: 0 | Refills: 0 | DISCHARGE

## 2020-01-06 RX ORDER — METOPROLOL TARTRATE 50 MG
1 TABLET ORAL
Qty: 60 | Refills: 0
Start: 2020-01-06 | End: 2020-02-04

## 2020-01-06 RX ORDER — NICOTINE POLACRILEX 2 MG
21 GUM BUCCAL
Qty: 21 | Refills: 0
Start: 2020-01-06 | End: 2020-02-04

## 2020-01-06 RX ADMIN — SODIUM CHLORIDE 3 MILLILITER(S): 9 INJECTION INTRAMUSCULAR; INTRAVENOUS; SUBCUTANEOUS at 05:51

## 2020-01-06 RX ADMIN — PANTOPRAZOLE SODIUM 40 MILLIGRAM(S): 20 TABLET, DELAYED RELEASE ORAL at 05:51

## 2020-01-06 RX ADMIN — CHLORHEXIDINE GLUCONATE 1 APPLICATION(S): 213 SOLUTION TOPICAL at 07:58

## 2020-01-06 RX ADMIN — Medication 30 MILLILITER(S): at 11:05

## 2020-01-06 RX ADMIN — ENOXAPARIN SODIUM 40 MILLIGRAM(S): 100 INJECTION SUBCUTANEOUS at 11:05

## 2020-01-06 RX ADMIN — Medication 50 MILLIGRAM(S): at 05:51

## 2020-01-06 RX ADMIN — AMLODIPINE BESYLATE 10 MILLIGRAM(S): 2.5 TABLET ORAL at 05:51

## 2020-01-06 RX ADMIN — SODIUM CHLORIDE 3 MILLILITER(S): 9 INJECTION INTRAMUSCULAR; INTRAVENOUS; SUBCUTANEOUS at 14:28

## 2020-01-06 RX ADMIN — Medication 81 MILLIGRAM(S): at 11:05

## 2020-01-06 RX ADMIN — POLYETHYLENE GLYCOL 3350 17 GRAM(S): 17 POWDER, FOR SOLUTION ORAL at 11:05

## 2020-01-06 RX ADMIN — Medication 50 MILLIGRAM(S): at 17:53

## 2020-01-06 NOTE — DIETITIAN INITIAL EVALUATION ADULT. - NAME AND PHONE
Dahiana Castellanos, Dietetic Intern Pager #[phone] Dahiana Castellanos, Dietetic Intern Pager #[phone]. Provided education on heart healthy diet, encouraged adequate protein and calorie intake throughout the day to aid in post-operative surgery healing by discussing protein rich food sources and oral nutrition supplements. Pt with no further nutrition related questions at this time, made aware RD remains available.

## 2020-01-06 NOTE — DIETITIAN INITIAL EVALUATION ADULT. - PROBLEM SELECTOR PLAN 1
P2Y12: 131  Needs TTE/Carotid dopplers  Preop ABX ordered  NPO after midnight  Continue with ASA  Initiate Beta blocker

## 2020-01-06 NOTE — PROGRESS NOTE ADULT - ASSESSMENT
63 yo Samoan, English speaking male with pmh of HTN, HLD, everyday tobacco use, ETOH abuse with consumption of roughly a quart of hard liquor daily, and syncopal episode on 12/15 while on vacation in [place].  The seizure was observed by family and was associated with foaming at the mouth and resulted in a tongue laceration.  Suspect possibly ETOH withdrawal seizure.  Work up with CT-Head and CT-Angio were negative and reports are on chart for review.  In addition, he was diagnosed with NSTEMI, refused further evaluation and  left AMA.  He presented to [facility] on 12/28/19 after experiencing left sided chest pain which lasted roughly 15 minutes and was associated with radiation to the left arm, SOB, and dizziness.   He underwent cardiac cath and was found to have multivessel CAD.  1/2 S/p C3L  1/4: transferred to [address] [place] [address]. VSS. SR 80s Small Left PTX noted on CXR post pull Left chest tube.   1/5: No events overnight. VSS  1/6: No active issues. Sorbitol given for bowel movement. Discharge planning today

## 2020-01-06 NOTE — DISCHARGE NOTE NURSING/CASE MANAGEMENT/SOCIAL WORK - PATIENT PORTAL LINK FT
You can access the FollowMyHealth Patient Portal offered by [facility] by registering at the following website: [email or web address]. By joining [employer]’s FollowMyHealth portal, you will also be able to view your health information using other applications (apps) compatible with our system.

## 2020-01-06 NOTE — DISCHARGE NOTE PROVIDER - NSDCMRMEDTOKEN_GEN_ALL_CORE_FT
amLODIPine 5 mg oral tablet: 1 tab(s) orally once a day  aspirin 81 mg oral delayed release tablet: 1 tab(s) orally once a day  atorvastatin 80 mg oral tablet: 1 tab(s) orally once a day (at bedtime)  metoprolol tartrate 50 mg oral tablet: 1 tab(s) orally 2 times a day  nicotine 21 mg/24 hr transdermal film, extended release: 21 milligram(s) transdermal once a day  pantoprazole 40 mg oral delayed release tablet: 1 tab(s) orally once a day (before a meal)  Percocet 5/325 oral tablet: 1 tab(s) orally every 4 hours, As needed, Mild Pain (1 - 3) MDD:6 tabs  polyethylene glycol 3350 oral powder for reconstitution: 17 gram(s) orally once a day, As Needed

## 2020-01-06 NOTE — DIETITIAN INITIAL EVALUATION ADULT. - REASON INDICATOR FOR ASSESSMENT
Pt seen on [facility] for length of stay visit.   Information obtained from patient, electronic medical record. Pt seen on [facility] for length of stay visit.   Information obtained from patient, family member, electronic medical record. Pt seen on [facility] for length of stay visit, BMI <19 referral.   Information obtained from patient, family member, electronic medical record.

## 2020-01-06 NOTE — PROGRESS NOTE ADULT - SUBJECTIVE AND OBJECTIVE BOX
VITAL SIGNS    Telemetry:    Vital Signs Last 24 Hrs  T(C): 36.8 (20 @ 05:26), Max: 36.9 (20 @ 19:51)  T(F): 98.2 (20 @ 05:26), Max: 98.5 (20 @ 19:51)  HR: 67 (20 @ 10:25) (67 - 80)  BP: 116/77 (20 @ 10:25) (110/71 - 130/76)  RR: 16 (20 05:26) (16 - 18)  SpO2: 93% (20 @ 10:25) (90% - 94%)             07:01  -   @ 07:00  --------------------------------------------------------  IN: 680 mL / OUT: 1100 mL / NET: -420 mL     07:01  -   @ 11:31  --------------------------------------------------------  IN: 240 mL / OUT: 0 mL / NET: 240 mL       Daily     Daily Weight in k.7 (2020 08:39)  Admit Wt: Drug Dosing Weight  Height (cm): 180.34 (2020 19:05)  Weight (kg): 57 (2020 19:05)  BMI (kg/m2): 17.5 (2020 19:05)  BSA (m2): 1.73 (2020 19:05)     Daily Weight in k.7 (20 @ 08:39)    LABS      133<L>  |  98  |  21  ----------------------------<  97  4.0   |  23  |  0.84    Ca    9.2      2020 06:44    TPro  6.5  /  Alb  4.3  /  TBili  0.5  /  DBili  x   /  AST  23  /  ALT  16  /  AlkPhos  36<L>  -                                 9.5    16.58 )-----------( 148      ( 2020 06:45 )             28.7                    CAPILLARY BLOOD GLUCOSE      POCT Blood Glucose.: 101 mg/dL (2020 07:53)  POCT Blood Glucose.: 109 mg/dL (2020 21:36)  POCT Blood Glucose.: 130 mg/dL (2020 16:57)  POCT Blood Glucose.: 113 mg/dL (2020 12:05)          Drains:     MS       [  ]   [  ]            L Pleural [  ]            R Pleural  [  ]            PAULINA  [  ]           Parker  [  ]    Pacing Wires      [  ]   Settings:                                  Isolated  [  ]                    CXR:      MEDICATIONS  amLODIPine   Tablet 10 milliGRAM(s) Oral daily  aspirin enteric coated 81 milliGRAM(s) Oral daily  atorvastatin 80 milliGRAM(s) Oral at bedtime  chlorhexidine 2% Cloths 1 Application(s) Topical <User Schedule>  dextrose 50% Injectable 50 milliLiter(s) IV Push every 15 minutes  dextrose 50% Injectable 25 milliLiter(s) IV Push every 15 minutes  enoxaparin Injectable 40 milliGRAM(s) SubCutaneous daily  influenza   Vaccine 0.5 milliLiter(s) IntraMuscular once  insulin lispro (HumaLOG) corrective regimen sliding scale   SubCutaneous Before meals and at bedtime  metoprolol tartrate 50 milliGRAM(s) Oral two times a day  oxycodone    5 mG/acetaminophen 325 mG 1 Tablet(s) Oral every 4 hours PRN  oxycodone    5 mG/acetaminophen 325 mG 2 Tablet(s) Oral every 6 hours PRN  pantoprazole    Tablet 40 milliGRAM(s) Oral before breakfast  polyethylene glycol 3350 17 Gram(s) Oral daily  sodium chloride 0.9% lock flush 3 milliLiter(s) IV Push every 8 hours      PHYSICAL EXAM      Neurology: alert and oriented x 3, nonfocal, no gross deficits  CV :S1S2  Sternal Wound :  CDI , Stable  Lungs: cta  Abdomen: soft, nontender, nondistended, positive bowel sounds, last bowel movement   :   voids    Extremities:   warm to touch no edema               PAST MEDICAL & SURGICAL HISTORY:  Syncope  Cigarette smoker within last 12 months  ETOH abuse  Hyperlipidemia  HTN (hypertension)  CAD, multiple vessel  HLD (hyperlipidemia)  HTN (hypertension)  No significant past surgical history  No significant past surgical history                 Discussed with Cardiothoracic Team at AM rounds.

## 2020-01-06 NOTE — DISCHARGE NOTE PROVIDER - CARE PROVIDER_API CALL
Greta Metzger  Cardiothoracic Office  [address] [place], [place] [address]  Phone: [phone]  Fax: (   )    -  Established Patient  Scheduled Appointment: 01/16/2020 01:30 PM

## 2020-01-06 NOTE — CHART NOTE - NSCHARTNOTEFT_GEN_A_CORE
Upon Nutritional Assessment by the Registered Dietitian your patient was determined to meet criteria / has evidence of the following diagnosis/diagnoses:          [ ]  Mild Protein Calorie Malnutrition        [ ]  Moderate Protein Calorie Malnutrition        [x] Severe Protein Calorie Malnutrition        [ ] Unspecified Protein Calorie Malnutrition        [x] Underweight / BMI <19        [ ] Morbid Obesity / BMI > 40      Findings as based on:  [x] Comprehensive nutrition assessment   [x] Nutrition Focused Physical Exam  [x] Other: BMI: 17.5 kG/m2, wt loss 12.5% x 6 months (significant)      Nutrition Plan/Recommendations:      Please see nutrition initial assessment for recommendations and interventions    PROVIDER Section:     By signing this assessment you are acknowledging and agree with the diagnosis/diagnoses assigned by the Registered Dietitian    Comments:

## 2020-01-06 NOTE — DIETITIAN INITIAL EVALUATION ADULT. - SIGNS/SYMPTOMS
12.5% weight loss in 6 months, intake <75% of estimated needs >1 month, muscle wasting, fat loss POD#4 C3L CABG

## 2020-01-06 NOTE — DISCHARGE NOTE NURSING/CASE MANAGEMENT/SOCIAL WORK - NSDCPEEMAIL_GEN_ALL_CORE
[facility] email [email or web address]

## 2020-01-06 NOTE — DISCHARGE NOTE NURSING/CASE MANAGEMENT/SOCIAL WORK - NSDCPEWEB_GEN_ALL_CORE
[place] website --- [email or web address] Tobacco [email or web address] website --- [email or web address]

## 2020-01-06 NOTE — DISCHARGE NOTE PROVIDER - NSDCCPCAREPLAN_GEN_ALL_CORE_FT
PRINCIPAL DISCHARGE DIAGNOSIS  Diagnosis: 3-vessel CAD  Assessment and Plan of Treatment: s/p cabg 3L on 1/2  You have an appointment to see Dr. Metzger on 1/16 at 130pm here at [address] in [facility] in the cardiothoracic office.  Please MAke your appointments with all your other providers such as your cardiologist primary care provider.  Please do not drive until cleared by surgeon  Please do not lift anything greater than 5 pounds.   Please follow the Do's and donts of cardiac surgery  walk every day  Continue to use your incentive spirometry as you have been taught while in the hospital

## 2020-01-06 NOTE — DISCHARGE NOTE PROVIDER - HOSPITAL COURSE
65 yo Uruguayan, English speaking male with pmh of HTN, HLD, everyday tobacco use, ETOH abuse with consumption of roughly a quart of hard liquor daily, and syncopal episode on 12/15 while on vacation in [place].  The seizure was observed by family and was associated with foaming at the mouth and resulted in a tongue laceration.  Suspect possibly ETOH withdrawal seizure.  Work up with CT-Head and CT-Angio were negative and reports are on chart for review.  In addition, he was diagnosed with NSTEMI, refused further evaluation and  left AMA.  He presented to [facility] on 12/28/19 after experiencing left sided chest pain which lasted roughly 15 minutes and was associated with radiation to the left arm, SOB, and dizziness.   He underwent cardiac cath and was found to have multivessel CAD.    1/2 S/p C3L    1/4: transferred to [address] [place] [address]. VSS. SR 80s Small Left PTX noted on CXR post pull Left chest tube.     1/5: No events overnight. VSS    1/6: No active issues. Sorbitol given for bowel movement. Discharge planning today

## 2020-01-06 NOTE — DIETITIAN INITIAL EVALUATION ADULT. - ADD RECOMMEND
1) Recommend liberalize to regular diet to maximize intake as pt is malnourished and with no history of DM, finger sticks controlled 2) Recommend add Ensure Enlive BID (700 kcal, 40g protein, 360 mL free H2O) 3) Recommend add thiamine, folic acid and multivitamin 4) Reinforce heart healthy diet education and encouragement of adequate protein/calorie intake as needed 5) Malnutrition and BMI alert placed in chart, discussed with team 6) Monitor PO intake, weights, labs, GI distress, skin integrity

## 2020-01-06 NOTE — DISCHARGE NOTE PROVIDER - PROVIDER TOKENS
FREE:[LAST:[Yassine],FIRST:[Greta],PHONE:[phone]],FAX:[(   )    -],ADDRESS:[Cardiothoracic Office  [address] [place] [address]],SCHEDULEDAPPT:[01/16/2020],SCHEDULEDAPPTTIME:[01:30 PM],ESTABLISHEDPATIENT:[T]]

## 2020-01-06 NOTE — DISCHARGE NOTE NURSING/CASE MANAGEMENT/SOCIAL WORK - NSDCFUADDAPPT_GEN_ALL_CORE_FT
Please follow up with your surgeon and medical doctors  Please go to [facility] (located in the main lobby) to  all of your medications

## 2020-01-06 NOTE — DIETITIAN INITIAL EVALUATION ADULT. - PHYSICAL APPEARANCE
underweight underweight/other (specify)/Nutrition focused physical exam conducted with preceptor present, with patient's verbal consent, consistent with the following findings: severe temporal, shoulder, interosseous and patellar muscle wasting, moderate clavicle and gastrocnemius muscle wasting, moderate orbital and triceps fat loss Height: 71 inches, Weight: 125.6 pounds (admit wt- standing)  IBW: 172 pounds +/-10%, IBW%: 76.5% BMI 17.5kg/m2  Pertinent information: no edema noted, surgical incisions mid sternal, right leg, noted at this time in chart

## 2020-01-06 NOTE — DIETITIAN INITIAL EVALUATION ADULT. - OTHER INFO
64yM with PMHx of HTN, HLD, NSTEMI, everyday tobacco use, ETOH abuse with consumption of ~1 quart of hard liquid daily, syncope/seizure event 12/15 resulting in tongue laceration, suspect possible ETOH withdrawal seizure. Pt presented to [facility] 12/28/19 with left sided chest pain with radiation to left arm, SOB, dizziness. Cardiac cath found to have multivessel CAD, transferred to [facility] for surgical intervention. POD#4 s/p C3L CABG (01/02). 64yM with PMHx of HTN, HLD, NSTEMI, everyday tobacco use, ETOH abuse with consumption of ~1 quart of hard liquid daily, syncope/seizure event 12/15 resulting in tongue laceration, suspect possible ETOH withdrawal seizure. Pt presented to [facility] 12/28/19 with left sided chest pain with radiation to left arm, SOB, dizziness. Cardiac cath found to have multivessel CAD, transferred to [facility] for surgical intervention. POD#4 s/p C3L CABG (01/02).    Upon assessment, pt resting in bed with family member by bedside. PTA pt reports fair appetite and PO intake with decrease appetite over the last ~6 months, consuming mostly chicken, fish, stews and soups prepared inside the house with little added salt. Pt reports not following any therapeutic dietary restrictions. Confirms history of excessive alcohol intake. Endorses NKFA. No chewing or swallowing difficulties. Pt denies nausea, vomiting, constipation or diarrhea. Last BM 01/04 per chart.   Pt reports fair-good intake in-house of meal trays, states has been improving since admission. Consistent with chart noting % intake. Pt amenable to oral nutrition supplements in-house.    Weight history: Pt reports weight loss over the past ~6 months secondary to decrease in appetite.  pounds. Admission weight (12/30) 125.6 pounds, indicative of 12.7% weight loss, significant. Current weight (01/06) 131.6 pounds, likely due to fluid accumulation post surgery. Will continue to monitor weights.    Provided education on heart healthy diet, encouraged adequate protein and calorie intake throughout the day to aid in post-operative surgery healing by discussing protein rich food sources and oral nutrition supplements. Pt with no further nutrition related questions at this time, made aware RD remains available. 64yM with PMHx of HTN, HLD, NSTEMI, everyday tobacco use, ETOH abuse with consumption of ~1 quart of hard liquid daily, syncope/seizure event 12/15 resulting in tongue laceration, suspect possible ETOH withdrawal seizure. Pt presented to [facility] 12/28/19 with left sided chest pain with radiation to left arm, SOB, dizziness. Cardiac cath found to have multivessel CAD, transferred to [facility] for surgical intervention. POD#4 s/p C3L CABG (01/02).    Upon assessment, pt resting in bed with family member by bedside. PTA pt reports fair appetite and PO intake with decrease appetite over the last ~6 months, consuming mostly chicken, fish, stews and soups prepared inside the house with little added salt. Pt reports not following any therapeutic dietary restrictions. Confirms history of excessive alcohol intake. Endorses NKFA. No chewing or swallowing difficulties. Pt denies nausea, vomiting, constipation or diarrhea. Last BM 01/04 per chart.   Pt reports fair-good intake in-house of meal trays, states has been improving since admission. Consistent with chart noting % intake. Pt amenable to oral nutrition supplements in-house.    Weight history: Pt reports weight loss over the past ~6 months secondary to decrease in appetite.  pounds. Admission weight (12/30) 125.6 pounds, indicative of 12.7% weight loss, significant. Current weight (01/06) 131.6 pounds, likely due to fluid accumulation post surgery. Will continue to monitor weights.

## 2020-01-08 DIAGNOSIS — Z86.69 PERSONAL HISTORY OF OTHER DISEASES OF THE NERVOUS SYSTEM AND SENSE ORGANS: ICD-10-CM

## 2020-01-08 RX ORDER — NICOTINE 21 MG/24HR
21 PATCH, TRANSDERMAL 24 HOURS TRANSDERMAL
Refills: 0 | Status: ACTIVE | COMMUNITY

## 2020-01-09 RX ORDER — OXYCODONE AND ACETAMINOPHEN 5; 325 MG/1; MG/1
5-325 TABLET ORAL
Qty: 40 | Refills: 0 | Status: ACTIVE | COMMUNITY
Start: 2020-01-09 | End: 1900-01-01

## 2020-01-16 ENCOUNTER — APPOINTMENT (OUTPATIENT)
Dept: CARDIOTHORACIC SURGERY | Facility: CLINIC | Age: 65
End: 2020-01-16
Payer: SELF-PAY

## 2020-01-16 VITALS
BODY MASS INDEX: 17.78 KG/M2 | SYSTOLIC BLOOD PRESSURE: 121 MMHG | HEART RATE: 68 BPM | RESPIRATION RATE: 16 BRPM | OXYGEN SATURATION: 96 % | WEIGHT: 127 LBS | DIASTOLIC BLOOD PRESSURE: 80 MMHG | HEIGHT: 71 IN | TEMPERATURE: 98.1 F

## 2020-01-16 DIAGNOSIS — Z95.1 PRESENCE OF AORTOCORONARY BYPASS GRAFT: ICD-10-CM

## 2020-01-16 PROCEDURE — 99024 POSTOP FOLLOW-UP VISIT: CPT

## 2020-02-05 ENCOUNTER — APPOINTMENT (OUTPATIENT)
Dept: CARDIOLOGY | Facility: HOSPITAL | Age: 65
End: 2020-02-05

## 2020-02-05 ENCOUNTER — OUTPATIENT (OUTPATIENT)
Dept: OUTPATIENT SERVICES | Facility: HOSPITAL | Age: 65
LOS: 1 days | End: 2020-02-05
Payer: SELF-PAY

## 2020-02-05 ENCOUNTER — NON-APPOINTMENT (OUTPATIENT)
Age: 65
End: 2020-02-05

## 2020-02-05 VITALS
DIASTOLIC BLOOD PRESSURE: 78 MMHG | BODY MASS INDEX: 17.78 KG/M2 | OXYGEN SATURATION: 98 % | HEART RATE: 69 BPM | HEIGHT: 71 IN | SYSTOLIC BLOOD PRESSURE: 134 MMHG | WEIGHT: 127 LBS

## 2020-02-05 DIAGNOSIS — I25.10 ATHEROSCLEROTIC HEART DISEASE OF NATIVE CORONARY ARTERY WITHOUT ANGINA PECTORIS: ICD-10-CM

## 2020-02-05 DIAGNOSIS — F17.200 NICOTINE DEPENDENCE, UNSPECIFIED, UNCOMPLICATED: ICD-10-CM

## 2020-02-05 DIAGNOSIS — Z86.79 PERSONAL HISTORY OF OTHER DISEASES OF THE CIRCULATORY SYSTEM: ICD-10-CM

## 2020-02-05 DIAGNOSIS — Z86.39 PERSONAL HISTORY OF OTHER ENDOCRINE, NUTRITIONAL AND METABOLIC DISEASE: ICD-10-CM

## 2020-02-05 DIAGNOSIS — Z78.9 OTHER SPECIFIED HEALTH STATUS: ICD-10-CM

## 2020-02-05 PROCEDURE — 93005 ELECTROCARDIOGRAM TRACING: CPT

## 2020-02-05 PROCEDURE — G0463: CPT

## 2020-02-05 NOTE — PHYSICAL EXAM
[General Appearance - Well Nourished] : well nourished [General Appearance - Well Developed] : well developed [Normal Jugular Venous V Waves Present] : normal jugular venous V waves present [Normal Jugular Venous A Waves Present] : normal jugular venous A waves present [Normal Conjunctiva] : the conjunctiva exhibited no abnormalities [Heart Rate And Rhythm] : heart rate and rhythm were normal [Heart Sounds] : normal S1 and S2 [Murmurs] : no murmurs present [Respiration, Rhythm And Depth] : normal respiratory rhythm and effort [Bowel Sounds] : normal bowel sounds [] : no respiratory distress [Nail Clubbing] : no clubbing of the fingernails [Abdomen Soft] : soft [Abnormal Walk] : normal gait [Skin Color & Pigmentation] : normal skin color and pigmentation [Cyanosis, Localized] : no localized cyanosis [Oriented To Time, Place, And Person] : oriented to person, place, and time

## 2020-02-05 NOTE — ASSESSMENT
[FreeTextEntry1] : 63 YO M s/p CABG here for routine follow up\par \par CAD\par s/p CABG\par c/w metoprolol 50mg BID\par c/w atorvastatin 80mg daily\par c/w Aspirin 81mg daily\par f/u 3 months

## 2020-02-05 NOTE — REVIEW OF SYSTEMS
[Negative] : Endocrine [Fever] : no fever [Chills] : no chills [Eyeglasses] : not currently wearing eyeglasses [Blurry Vision] : no blurred vision [Mouth Sores] : no mouth sores [Earache] : no earache [Shortness Of Breath] : no shortness of breath [Chest Pain] : no chest pain [Palpitations] : no palpitations [Abdominal Pain] : no abdominal pain [Cough] : no cough

## 2020-05-06 RX ORDER — PANTOPRAZOLE 40 MG/1
40 TABLET, DELAYED RELEASE ORAL DAILY
Qty: 90 | Refills: 1 | Status: ACTIVE | COMMUNITY
Start: 1900-01-01 | End: 1900-01-01

## 2020-09-16 ENCOUNTER — APPOINTMENT (OUTPATIENT)
Dept: CARDIOLOGY | Facility: HOSPITAL | Age: 65
End: 2020-09-16

## 2020-09-16 ENCOUNTER — OUTPATIENT (OUTPATIENT)
Dept: OUTPATIENT SERVICES | Facility: HOSPITAL | Age: 65
LOS: 1 days | End: 2020-09-16
Payer: SELF-PAY

## 2020-09-16 ENCOUNTER — NON-APPOINTMENT (OUTPATIENT)
Age: 65
End: 2020-09-16

## 2020-09-16 VITALS — DIASTOLIC BLOOD PRESSURE: 76 MMHG | SYSTOLIC BLOOD PRESSURE: 132 MMHG | HEART RATE: 54 BPM

## 2020-09-16 DIAGNOSIS — I25.10 ATHEROSCLEROTIC HEART DISEASE OF NATIVE CORONARY ARTERY WITHOUT ANGINA PECTORIS: ICD-10-CM

## 2020-09-16 PROCEDURE — 93005 ELECTROCARDIOGRAM TRACING: CPT

## 2020-09-16 PROCEDURE — G0463: CPT

## 2020-09-16 RX ORDER — METOPROLOL SUCCINATE 50 MG/1
50 TABLET, EXTENDED RELEASE ORAL DAILY
Qty: 90 | Refills: 3 | Status: ACTIVE | COMMUNITY
Start: 2020-09-16 | End: 1900-01-01

## 2020-09-16 RX ORDER — ATORVASTATIN CALCIUM 80 MG/1
80 TABLET, FILM COATED ORAL DAILY
Qty: 90 | Refills: 3 | Status: ACTIVE | COMMUNITY

## 2020-09-16 RX ORDER — AMLODIPINE BESYLATE 5 MG/1
5 TABLET ORAL TWICE DAILY
Qty: 180 | Refills: 3 | Status: ACTIVE | COMMUNITY

## 2020-09-16 RX ORDER — ASPIRIN ENTERIC COATED TABLETS 81 MG 81 MG/1
81 TABLET, DELAYED RELEASE ORAL DAILY
Qty: 90 | Refills: 3 | Status: ACTIVE | COMMUNITY

## 2020-09-16 RX ORDER — METOPROLOL TARTRATE 50 MG/1
50 TABLET, FILM COATED ORAL
Qty: 180 | Refills: 1 | Status: DISCONTINUED | COMMUNITY
Start: 1900-01-01 | End: 2020-09-16

## 2020-09-16 NOTE — DISCUSSION/SUMMARY
[FreeTextEntry1] : 64 M with PMH of CAD s/p CABG x 3 on 1/2020 presents for follow up. A few episodes of orthostatic dizziness that self resolved. Patient would like to go back to [facility].\par \par Plan:\par -reduce metop to 50mg succinate daily\par -continue amlodipine 5mg BID, ASA, atorvastatin 80mg\par -lipid profile\par \par Patient will follow up with doctors on [facility]\par \par Deborah Galeas MD\par Cardiology Fellow

## 2020-09-16 NOTE — PHYSICAL EXAM
[General Appearance - Well Developed] : well developed [Normal Appearance] : normal appearance [Well Groomed] : well groomed [No Deformities] : no deformities [General Appearance - In No Acute Distress] : no acute distress [General Appearance - Well Nourished] : well nourished [Eyelids - No Xanthelasma] : the eyelids demonstrated no xanthelasmas [Normal Conjunctiva] : the conjunctiva exhibited no abnormalities [Normal Oral Mucosa] : normal oral mucosa [No Oral Pallor] : no oral pallor [No Oral Cyanosis] : no oral cyanosis [Normal Jugular Venous A Waves Present] : normal jugular venous A waves present [No Jugular Venous Elliott A Waves] : no jugular venous elliott A waves [Normal Jugular Venous V Waves Present] : normal jugular venous V waves present [Exaggerated Use Of Accessory Muscles For Inspiration] : no accessory muscle use [Respiration, Rhythm And Depth] : normal respiratory rhythm and effort [Heart Rate And Rhythm] : heart rate and rhythm were normal [Auscultation Breath Sounds / Voice Sounds] : lungs were clear to auscultation bilaterally [Abdomen Soft] : soft [Abdomen Tenderness] : non-tender [Murmurs] : no murmurs present [Heart Sounds] : normal S1 and S2 [Abdomen Mass (___ Cm)] : no abdominal mass palpated [Nail Clubbing] : no clubbing of the fingernails [Petechial Hemorrhages (___cm)] : no petechial hemorrhages [Cyanosis, Localized] : no localized cyanosis [] : no ischemic changes

## 2020-09-16 NOTE — HISTORY OF PRESENT ILLNESS
[FreeTextEntry1] : 65 yo Lottie M with PMhx of CAD s/p CABG LIMA x 3 recently in Jan 2020 presents for routine follow up. The patient would like to go back to [facility] soon. The patient had three episode of dizziness that lasted a few seconds when he got up too quickly. One episode a few weeks ago lasted a little longer. No LOC, chest pain, palpitations, SOB, PND, orthopnea, leg edema. \par \par Echo: 12/31/2019, normal LV function, no pulmonary hypertension, normal LA size \par No complaints. No chest pain, can walk 3/4 mile, improved with CABG. \par \par \par \par meds:\par amLODIPine Besylate 5 MG Oral Tablet\par Aspirin 81 MG Oral Tablet Delayed Release\par Atorvastatin Calcium 80 MG Oral Tablet\par Metoprolol Tartrate 50 MG Oral Tablet; TAKE 1 TABLET EVERY 12 HOURS DAILY\par Nicotine 21 MG/24HR Transdermal Patch 24 Hour\par oxyCODONE-Acetaminophen 5-325 MG Oral Tablet; TAKE 1 TABLET EVERY 6 HOURS\par AS NEEDED FOR PAIN. MDD:4 tablets TDD:4 tabs\par Pantoprazole Sodium 40 MG Oral Tablet Delayed Release\par

## 2020-10-07 LAB
CHOLEST SERPL-MCNC: 128 MG/DL
CHOLEST/HDLC SERPL: 1.9 RATIO
ESTIMATED AVERAGE GLUCOSE: 120 MG/DL
HBA1C MFR BLD HPLC: 5.8 %
HDLC SERPL-MCNC: 67 MG/DL
LDLC SERPL CALC-MCNC: 42 MG/DL
TRIGL SERPL-MCNC: 94 MG/DL

## 2020-10-13 NOTE — ED ADULT NURSE NOTE - NSSUHOSCREENINGYN_ED_ALL_ED
Arterial Line    Date/Time: 10/13/2020 8:08 AM  Performed by: Mayra Cooper MD  Authorized by: Mayra Cooper MD     Patient Location:  OR  Indication*:  Continuous blood pressure monitoring  Site*:  Radial  Max Sterile Barrier Technique*:  Sterile gloves, Sterile dressing applied and Cap/Mask  Local Anesthetic:  None  Prep*:  Chlorhexidine gluconate (CHG)  Catheter Size*:  20 G  Catheter Type:  Arrow  Line Secured*:  Tape, Transparent dressing and Transparent dressing w/border  Events: patient tolerated procedure well with no complications    Start Time:  10/13/2020 8:05 AM         Yes - the patient is able to be screened

## 2021-04-15 NOTE — H&P ADULT - PSYCHIATRIC DETAILS
What Type Of Note Output Would You Prefer (Optional)?: Standard Output Hpi Title: Evaluation of Skin Lesions How Severe Are Your Spot(S)?: mild Have Your Spot(S) Been Treated In The Past?: has not been treated normal affect/normal behavior

## 2021-07-12 NOTE — PATIENT PROFILE ADULT - SAFE PLACE TO LIVE
Patient's left chest tube was clamped at 1pm yesterday  CXR this AM with no pneumothorax  Left chest tube removed at bedside  Patient tolerated well      Shadia Suárez PA-C no

## 2022-08-23 NOTE — DISCHARGE NOTE PROVIDER - CARE PROVIDERS DIRECT ADDRESSES

## 2022-12-09 NOTE — REASON FOR VISIT
Last Appt:  8/12/2022  Next Appt:   12/22/2022  Med verified in [employer] ran today [Initial Evaluation] : an initial evaluation of [CABG Follow-up] : bypass graft [Family Member] : family member [FreeTextEntry1] : 65 YO M hx CAD recently admitted to [facility] s/p CABG LIMA x 3. Discharged on 1/6. No complaints. No chest pain, can walk 3/4 mile, improved with CABG. Occasional tightness in right shoulder. Sometimes with exercise, sometimes with rest. Lasts few minutes to half hour. No numbness, paresthesias, weakness. Typically takes Percocet or tylenol for pain. No trouble sleeping. At times feels weak with loss of appetite. No other complaints.

## 2025-01-15 NOTE — PROGRESS NOTE ADULT - REASON FOR ADMISSION
CABG
[Follow-Up Visit] : a follow-up
[Follow-Up Visit] : a follow-up
CABG